# Patient Record
Sex: MALE | Race: WHITE | NOT HISPANIC OR LATINO | Employment: UNEMPLOYED | URBAN - METROPOLITAN AREA
[De-identification: names, ages, dates, MRNs, and addresses within clinical notes are randomized per-mention and may not be internally consistent; named-entity substitution may affect disease eponyms.]

---

## 2022-12-28 ENCOUNTER — HOSPITAL ENCOUNTER (INPATIENT)
Facility: HOSPITAL | Age: 62
LOS: 2 days | Discharge: HOME | DRG: 864 | End: 2022-12-30
Attending: FAMILY MEDICINE | Admitting: FAMILY MEDICINE
Payer: COMMERCIAL

## 2022-12-28 DIAGNOSIS — R50.81 NEUTROPENIC FEVER: ICD-10-CM

## 2022-12-28 DIAGNOSIS — R05.1 ACUTE COUGH: Primary | ICD-10-CM

## 2022-12-28 DIAGNOSIS — D70.9 NEUTROPENIC FEVER: ICD-10-CM

## 2022-12-28 DIAGNOSIS — R07.9 CHEST PAIN: ICD-10-CM

## 2022-12-28 DIAGNOSIS — R50.9 FEVER OF UNKNOWN ORIGIN: ICD-10-CM

## 2022-12-28 LAB
APPEARANCE UR: CLEAR
BILIRUB UR QL STRIP.AUTO: NEGATIVE MG/DL
COLOR UR AUTO: YELLOW
ERYTHROCYTE [SEDIMENTATION RATE] IN BLOOD: 14 MM/HR (ref 0–15)
FERRITIN SERPL-MCNC: 591.74 NG/ML (ref 21.81–274.66)
GLUCOSE UR QL STRIP.AUTO: NEGATIVE MG/DL
INR BLD: 0.94 (ref 0–1.3)
KETONES UR QL STRIP.AUTO: NEGATIVE MG/DL
LEUKOCYTE ESTERASE UR QL STRIP.AUTO: NEGATIVE UNIT/L
NITRITE UR QL STRIP.AUTO: NEGATIVE
PH UR STRIP.AUTO: 5 [PH]
PROT UR QL STRIP.AUTO: NEGATIVE MG/DL
PROTHROMBIN TIME: 12.5 SECONDS (ref 12.5–14.5)
RBC UR QL AUTO: NEGATIVE UNIT/L
SP GR UR STRIP.AUTO: 1.02
TROPONIN I SERPL-MCNC: <0.01 NG/ML (ref 0–0.04)
URATE SERPL-MCNC: 7.1 MG/DL (ref 3.5–7.2)
UROBILINOGEN UR STRIP-ACNC: 0.2 MG/DL

## 2022-12-28 PROCEDURE — 36415 COLL VENOUS BLD VENIPUNCTURE: CPT | Performed by: FAMILY MEDICINE

## 2022-12-28 PROCEDURE — 84550 ASSAY OF BLOOD/URIC ACID: CPT | Performed by: FAMILY MEDICINE

## 2022-12-28 PROCEDURE — 63600175 PHARM REV CODE 636 W HCPCS: Performed by: FAMILY MEDICINE

## 2022-12-28 PROCEDURE — 87040 BLOOD CULTURE FOR BACTERIA: CPT | Performed by: FAMILY MEDICINE

## 2022-12-28 PROCEDURE — 11000001 HC ACUTE MED/SURG PRIVATE ROOM

## 2022-12-28 PROCEDURE — 86140 C-REACTIVE PROTEIN: CPT | Performed by: FAMILY MEDICINE

## 2022-12-28 PROCEDURE — 85651 RBC SED RATE NONAUTOMATED: CPT | Performed by: FAMILY MEDICINE

## 2022-12-28 PROCEDURE — 85610 PROTHROMBIN TIME: CPT | Performed by: FAMILY MEDICINE

## 2022-12-28 PROCEDURE — 82728 ASSAY OF FERRITIN: CPT | Performed by: FAMILY MEDICINE

## 2022-12-28 PROCEDURE — 84484 ASSAY OF TROPONIN QUANT: CPT | Performed by: FAMILY MEDICINE

## 2022-12-28 PROCEDURE — 25000003 PHARM REV CODE 250: Performed by: FAMILY MEDICINE

## 2022-12-28 PROCEDURE — 81003 URINALYSIS AUTO W/O SCOPE: CPT | Performed by: FAMILY MEDICINE

## 2022-12-28 RX ORDER — ACETAMINOPHEN 325 MG/1
650 TABLET ORAL EVERY 8 HOURS PRN
Status: DISCONTINUED | OUTPATIENT
Start: 2022-12-28 | End: 2022-12-30 | Stop reason: HOSPADM

## 2022-12-28 RX ORDER — GLUCAGON 1 MG
1 KIT INJECTION
Status: DISCONTINUED | OUTPATIENT
Start: 2022-12-28 | End: 2022-12-30 | Stop reason: HOSPADM

## 2022-12-28 RX ORDER — CEFTRIAXONE 1 G/1
1 INJECTION, POWDER, FOR SOLUTION INTRAMUSCULAR; INTRAVENOUS
Status: DISCONTINUED | OUTPATIENT
Start: 2022-12-28 | End: 2022-12-28

## 2022-12-28 RX ORDER — NALOXONE HCL 0.4 MG/ML
0.02 VIAL (ML) INJECTION
Status: DISCONTINUED | OUTPATIENT
Start: 2022-12-28 | End: 2022-12-30 | Stop reason: HOSPADM

## 2022-12-28 RX ORDER — SODIUM CHLORIDE 0.9 % (FLUSH) 0.9 %
10 SYRINGE (ML) INJECTION EVERY 12 HOURS PRN
Status: DISCONTINUED | OUTPATIENT
Start: 2022-12-28 | End: 2022-12-30 | Stop reason: HOSPADM

## 2022-12-28 RX ADMIN — CEFTRIAXONE SODIUM 1 G: 1 INJECTION, POWDER, FOR SOLUTION INTRAMUSCULAR; INTRAVENOUS at 05:12

## 2022-12-28 NOTE — NURSING
PT DOES NO C/O ANY PAIN AT THIS TIME. C/O FEVER OF UNKNOWN ORIGIN UPON ARRIVAL. PT STATES BEEN SICK ABOUT 3 WEEKS NOW. PT HAS HX OF PREVIOUS CATARACT SX 6-7 YRS AGO. SWELLEN TO LEFT ANKLE ABLE TO BEAR WEIGHT NO C/O PAIN AT THIS TIME. PT STATES WAS USING CRUTCHES FOR A WEEK HAS BEEN OFF ABOUT X5 DAYS. WILL CONTINUE TO MONITOR FOR ANY CHANGES.

## 2022-12-28 NOTE — NURSING
PT ARRIVED DIRECT ADMIT PER DR. LUGO FOR INPATIENT. PT AAOX4 AMBULATORY C/O COUGH AND FEBRILE TEMPS. WILL CONTINUE TO MONITOR WIFE AT BEDSIDE

## 2022-12-29 PROBLEM — M25.40 SWOLLEN JOINT: Status: ACTIVE | Noted: 2022-12-29

## 2022-12-29 PROBLEM — R01.1 CHANGE IN HEART MURMUR: Status: ACTIVE | Noted: 2022-12-29

## 2022-12-29 PROBLEM — R50.9 FEVER AND CHILLS: Status: ACTIVE | Noted: 2022-12-29

## 2022-12-29 PROBLEM — R05.1 ACUTE COUGH: Status: ACTIVE | Noted: 2022-12-29

## 2022-12-29 LAB
ALBUMIN SERPL-MCNC: 3.8 G/DL (ref 3.4–4.8)
ALBUMIN/GLOB SERPL: 1.2 RATIO (ref 1.1–2)
ALP SERPL-CCNC: 49 UNIT/L (ref 40–150)
ALT SERPL-CCNC: 22 UNIT/L (ref 0–55)
AST SERPL-CCNC: 16 UNIT/L (ref 5–34)
BASOPHILS # BLD AUTO: 0.02 X10(3)/MCL (ref 0–0.2)
BASOPHILS NFR BLD AUTO: 0.3 %
BILIRUBIN DIRECT+TOT PNL SERPL-MCNC: 0.5 MG/DL
BUN SERPL-MCNC: 13 MG/DL (ref 8.4–25.7)
CALCIUM SERPL-MCNC: 9.5 MG/DL (ref 8.8–10)
CHLORIDE SERPL-SCNC: 103 MMOL/L (ref 98–107)
CO2 SERPL-SCNC: 24 MMOL/L (ref 23–31)
CREAT SERPL-MCNC: 1.69 MG/DL (ref 0.73–1.18)
CRP SERPL-MCNC: 18.7 MG/L
EOSINOPHIL # BLD AUTO: 0.14 X10(3)/MCL (ref 0–0.9)
EOSINOPHIL NFR BLD AUTO: 2.2 %
ERYTHROCYTE [DISTWIDTH] IN BLOOD BY AUTOMATED COUNT: 11.3 % (ref 11.6–14.4)
FIBRINOGEN PPP-MCNC: 389 MG/DL (ref 210–463)
GFR SERPLBLD CREATININE-BSD FMLA CKD-EPI: 45 MLS/MIN/1.73/M2
GLOBULIN SER-MCNC: 3.3 GM/DL (ref 2.4–3.5)
GLUCOSE SERPL-MCNC: 112 MG/DL (ref 82–115)
HCT VFR BLD AUTO: 40 % (ref 42–52)
HGB BLD-MCNC: 13.3 GM/DL (ref 14–18)
IMM GRANULOCYTES # BLD AUTO: 0.03 X10(3)/MCL (ref 0–0.04)
IMM GRANULOCYTES NFR BLD AUTO: 0.5 %
LYMPHOCYTES # BLD AUTO: 1.07 X10(3)/MCL (ref 0.6–4.6)
LYMPHOCYTES NFR BLD AUTO: 17 %
MCH RBC QN AUTO: 29.6 PG
MCHC RBC AUTO-ENTMCNC: 33.3 MG/DL (ref 33–36)
MCV RBC AUTO: 89.1 FL (ref 80–94)
MONOCYTES # BLD AUTO: 0.77 X10(3)/MCL (ref 0.1–1.3)
MONOCYTES NFR BLD AUTO: 12.2 %
NEUTROPHILS # BLD AUTO: 4.27 X10(3)/MCL (ref 2.1–9.2)
NEUTROPHILS NFR BLD AUTO: 67.8 %
PLATELET # BLD AUTO: 225 X10(3)/MCL (ref 140–371)
PMV BLD AUTO: 9.4 FL (ref 9.4–12.4)
POTASSIUM SERPL-SCNC: 4.2 MMOL/L (ref 3.5–5.1)
PROT SERPL-MCNC: 7.1 GM/DL (ref 5.8–7.6)
RBC # BLD AUTO: 4.49 X10(6)/MCL (ref 4.7–6.1)
SODIUM SERPL-SCNC: 136 MMOL/L (ref 136–145)
WBC # SPEC AUTO: 6.3 X10(3)/MCL (ref 4.5–11.5)

## 2022-12-29 PROCEDURE — 80053 COMPREHEN METABOLIC PANEL: CPT | Performed by: FAMILY MEDICINE

## 2022-12-29 PROCEDURE — 63600175 PHARM REV CODE 636 W HCPCS: Performed by: FAMILY MEDICINE

## 2022-12-29 PROCEDURE — 25000003 PHARM REV CODE 250: Performed by: FAMILY MEDICINE

## 2022-12-29 PROCEDURE — 11000001 HC ACUTE MED/SURG PRIVATE ROOM

## 2022-12-29 PROCEDURE — 94761 N-INVAS EAR/PLS OXIMETRY MLT: CPT

## 2022-12-29 PROCEDURE — 85025 COMPLETE CBC W/AUTO DIFF WBC: CPT | Performed by: FAMILY MEDICINE

## 2022-12-29 PROCEDURE — 85384 FIBRINOGEN ACTIVITY: CPT | Performed by: FAMILY MEDICINE

## 2022-12-29 PROCEDURE — 36415 COLL VENOUS BLD VENIPUNCTURE: CPT | Performed by: FAMILY MEDICINE

## 2022-12-29 RX ORDER — AMLODIPINE BESYLATE 10 MG/1
10 TABLET ORAL DAILY
Status: DISCONTINUED | OUTPATIENT
Start: 2022-12-29 | End: 2022-12-30 | Stop reason: HOSPADM

## 2022-12-29 RX ORDER — ESOMEPRAZOLE MAGNESIUM 10 MG/1
10 GRANULE, FOR SUSPENSION, EXTENDED RELEASE ORAL
Status: ON HOLD | COMMUNITY
End: 2023-03-17 | Stop reason: SDUPTHER

## 2022-12-29 RX ORDER — CLONIDINE HYDROCHLORIDE 0.1 MG/1
0.1 TABLET ORAL EVERY 8 HOURS PRN
Status: DISCONTINUED | OUTPATIENT
Start: 2022-12-29 | End: 2022-12-30 | Stop reason: HOSPADM

## 2022-12-29 RX ORDER — SODIUM CHLORIDE 9 MG/ML
INJECTION, SOLUTION INTRAVENOUS CONTINUOUS
Status: DISCONTINUED | OUTPATIENT
Start: 2022-12-29 | End: 2022-12-30 | Stop reason: HOSPADM

## 2022-12-29 RX ORDER — FENOFIBRATE 160 MG/1
145 TABLET ORAL EVERY MORNING
COMMUNITY

## 2022-12-29 RX ORDER — AMLODIPINE BESYLATE 10 MG/1
10 TABLET ORAL EVERY MORNING
COMMUNITY

## 2022-12-29 RX ORDER — MUPIROCIN 20 MG/G
OINTMENT TOPICAL 2 TIMES DAILY
Status: DISCONTINUED | OUTPATIENT
Start: 2022-12-29 | End: 2022-12-30 | Stop reason: HOSPADM

## 2022-12-29 RX ORDER — BENZONATATE 100 MG/1
100 CAPSULE ORAL 3 TIMES DAILY PRN
Status: DISCONTINUED | OUTPATIENT
Start: 2022-12-29 | End: 2022-12-30 | Stop reason: HOSPADM

## 2022-12-29 RX ADMIN — AZITHROMYCIN MONOHYDRATE 500 MG: 500 INJECTION, POWDER, LYOPHILIZED, FOR SOLUTION INTRAVENOUS at 09:12

## 2022-12-29 RX ADMIN — MUPIROCIN: 20 OINTMENT TOPICAL at 10:12

## 2022-12-29 RX ADMIN — BENZONATATE 100 MG: 100 CAPSULE ORAL at 12:12

## 2022-12-29 RX ADMIN — ACETAMINOPHEN 650 MG: 325 TABLET ORAL at 06:12

## 2022-12-29 RX ADMIN — MUPIROCIN 1 G: 20 OINTMENT TOPICAL at 09:12

## 2022-12-29 RX ADMIN — CEFTRIAXONE SODIUM 1 G: 1 INJECTION, POWDER, FOR SOLUTION INTRAMUSCULAR; INTRAVENOUS at 06:12

## 2022-12-29 RX ADMIN — ACETAMINOPHEN 650 MG: 325 TABLET ORAL at 01:12

## 2022-12-29 RX ADMIN — AMLODIPINE BESYLATE 10 MG: 10 TABLET ORAL at 12:12

## 2022-12-29 RX ADMIN — SODIUM CHLORIDE: 9 INJECTION, SOLUTION INTRAVENOUS at 11:12

## 2022-12-29 RX ADMIN — SODIUM CHLORIDE 500 ML: 9 INJECTION, SOLUTION INTRAVENOUS at 09:12

## 2022-12-29 RX ADMIN — ACETAMINOPHEN 650 MG: 325 TABLET ORAL at 09:12

## 2022-12-29 NOTE — PLAN OF CARE
Pt lives at home with wife. Normally independent and has help if needed. PCP: Lauri. Able to get Rx from pharmacy. Denies any needs at this time. Will follow.

## 2022-12-29 NOTE — PLAN OF CARE
PT LYING IN BED QUIETLY WITH EYES CLOSED.EASILY AROUSED TO VERBAL AND TACTILE STIMULI. RESP EVEN AND UNLABORED NO DISTRESS NOTED. NO C/O PAIN AT THIS TIME. PT RECEIVED PRN TYLENOL X1 DUE TO FEBRILE TEMP 101.2. TOLERATED WELL WILL CONTINUE TO MONITOR

## 2022-12-29 NOTE — H&P
HISTORY & PHYSICAL  Hospital Medicine    Team: Networked reference to record PCT     PRESENTING HISTORY     Chief Complaint/Reason for Admission:  fever     History of Present Illness:  Mr. Joshua Randle is a 62 y.o. male who presented with fever.  Also with cough.  Patient has a recent history of illness.  About 3-4 weeks ago he had a viral respiratory illness  when that was resolving, he developed a swollen left foot initially, he thought this was a gout flare.  By the time this symptoms occurred his respiratory illness had almost fully resolved However, it did not respond to colchicine as his gout flares normally do his uric acid was normal and the joint was not hot or tender as his gout flares normally presents with a swollen joint.  It was tender but not exquisitely tender.  He was unable to walk without crutches.  I did labs on him including an THA, CCP, rheumatoid factor his CRP was negative in his sed rate was in the mid 30s.  He responded quite well to low-dose prednisone the next day his symptoms are 90% better.  This is still a little sore but almost completely resolved.  He was seen yesterday and was doing fine he had 1 day of prednisone and 1 day diclofenac left.  Also of note when he was seen for his swollen foot he also had a small pustule/abscess underneath his right arm which I lanced delivery small amount of pus came out.  I put him on Keflex at that time the next day that was almost totally resolved    On 12/28, he presented to our clinic and was seen by Dr. Shay with 99.5 temp, however he had had 101 tab at home.  He had chills and he had developed a cough.  Not any since those present yesterday.  His COVID and flu tests were negative.  He did seem to have a murmur which I may have heard a very slight 1 yesterday on exam but this was an obvious murmur that I did not hear on exam prior.  His presentation yesterday was not that alarming in isolation adjust his 1 day.  However, with his prior  presentation at the swollen foot and now with fever, there was a bed available and we admitted him for a workup with blood cultures, chest x-ray, IV antibiotics, repeat sed rate and CRP, an echocardiogram to rule out any valve issues.  Patient was reluctant for admit that he did eventually agree to    Review of Systems:  ROS  As above  Negative for any GI symptoms  Negative for chest pain shortness is breath  Negative for any swollen joints other than  His foot 2 weeks ago negative for any rashes    PAST HISTORY:     Past Medical History:   Diagnosis Date    Hypertension        Past Surgical History:   Procedure Laterality Date    CATARACT EXTRACTION W/ INTRAOCULAR LENS  IMPLANT, BILATERAL Bilateral     6-7 YEARS AGO    EYE SURGERY         Family History   Problem Relation Age of Onset    Cancer Father        Social History     Socioeconomic History    Marital status:    Tobacco Use    Smoking status: Never    Smokeless tobacco: Never   Substance and Sexual Activity    Alcohol use: Yes     Alcohol/week: 84.0 standard drinks     Types: 84 Cans of beer per week     Comment: 12 BEERS/DAY    Drug use: Never    Sexual activity: Yes     Partners: Female       MEDICATIONS & ALLERGIES:     No current facility-administered medications on file prior to encounter.     No current outpatient medications on file prior to encounter.        Review of patient's allergies indicates:   Allergen Reactions    Venom-wasp Hives       OBJECTIVE:     Vital Signs:  Temp:  [98.4 °F (36.9 °C)-101.2 °F (38.4 °C)] 101.2 °F (38.4 °C)  Pulse:  [75-90] 90  Resp:  [20] 20  SpO2:  [93 %-98 %] 93 %  BP: (157-172)/(84-85) 157/85  Body mass index is 25.09 kg/m².     Physical Exam:  Physical Exam  Vitals:    12/29/22 0130   BP:    Pulse:    Resp:    Temp: (!) 101.2 °F (38.4 °C)       Physical Exam   Constitutional: alert & oriented, no acute distress  HENT:   Head: Normocephalic and atraumatic.   Eyes: Conjunctivae normal and EOM are normal.  Pupils are equal, round, and reactive to light.   Neck: Normal range of motion. Neck supple.   Cardiovascular: Normal rate, regular rhythm, normal heart sounds  systolic  murmur  2/6    Pulmonary/Chest: CTAB, nonlabored respiration  Abdominal: Soft, nontender, bowel sounds normal  Neurological: nonfocal  Skin: warm, dry intact  No   splinter  hemorrhages    No  purpera   No  lesions to  eyes   Psychiatric: normal mood and affect, cooperative        Laboratory  Recent Results (from the past 24 hour(s))   Urinalysis, Reflex to Urine Culture Urine, Clean Catch    Collection Time: 12/28/22  5:00 PM    Specimen: Urine   Result Value Ref Range    Color, UA Yellow Yellow, Light-Yellow, Dark Yellow, Glenny, Straw    Appearance, UA Clear Clear    Specific Gravity, UA 1.025     pH, UA 5.0 5.0 - 8.5    Protein, UA Negative Negative mg/dL    Glucose, UA Negative Negative, Normal mg/dL    Ketones, UA Negative Negative mg/dL    Blood, UA Negative Negative unit/L    Bilirubin, UA Negative Negative mg/dL    Urobilinogen, UA 0.2 0.2, 1.0, Normal mg/dL    Nitrites, UA Negative Negative    Leukocyte Esterase, UA Negative Negative unit/L   Ferritin    Collection Time: 12/28/22  5:02 PM   Result Value Ref Range    Ferritin Level 591.74 (H) 21.81 - 274.66 ng/mL   PT/INR    Collection Time: 12/28/22  5:02 PM   Result Value Ref Range    PT 12.5 12.5 - 14.5 seconds    INR 0.94 0.00 - 1.30   Sedimentation rate    Collection Time: 12/28/22  5:02 PM   Result Value Ref Range    Sed Rate 14 0 - 15 mm/hr   Uric Acid    Collection Time: 12/28/22  5:02 PM   Result Value Ref Range    Uric Acid 7.1 3.5 - 7.2 mg/dL   Troponin I    Collection Time: 12/28/22  5:39 PM   Result Value Ref Range    Troponin-I <0.010 0.000 - 0.045 ng/mL   Comprehensive Metabolic Panel (CMP)    Collection Time: 12/29/22  4:30 AM   Result Value Ref Range    Sodium Level 136 136 - 145 mmol/L    Potassium Level 4.2 3.5 - 5.1 mmol/L    Chloride 103 98 - 107 mmol/L    Carbon  Dioxide 24 23 - 31 mmol/L    Glucose Level 112 82 - 115 mg/dL    Blood Urea Nitrogen 13.0 8.4 - 25.7 mg/dL    Creatinine 1.69 (H) 0.73 - 1.18 mg/dL    Calcium Level Total 9.5 8.8 - 10.0 mg/dL    Protein Total 7.1 5.8 - 7.6 gm/dL    Albumin Level 3.8 3.4 - 4.8 g/dL    Globulin 3.3 2.4 - 3.5 gm/dL    Albumin/Globulin Ratio 1.2 1.1 - 2.0 ratio    Bilirubin Total 0.5 <=1.5 mg/dL    Alkaline Phosphatase 49 40 - 150 unit/L    Alanine Aminotransferase 22 0 - 55 unit/L    Aspartate Aminotransferase 16 5 - 34 unit/L    eGFR 45 mls/min/1.73/m2   CBC with Differential    Collection Time: 12/29/22  4:30 AM   Result Value Ref Range    WBC 6.3 4.5 - 11.5 x10(3)/mcL    RBC 4.49 (L) 4.70 - 6.10 x10(6)/mcL    Hgb 13.3 (L) 14.0 - 18.0 gm/dL    Hct 40.0 (L) 42.0 - 52.0 %    MCV 89.1 80.0 - 94.0 fL    MCH 29.6 pg    MCHC 33.3 33.0 - 36.0 mg/dL    RDW 11.3 (L) 11.6 - 14.4 %    Platelet 225 140 - 371 x10(3)/mcL    MPV 9.4 9.4 - 12.4 fL    Neut % 67.8 %    Lymph % 17.0 %    Mono % 12.2 %    Eos % 2.2 %    Basophil % 0.3 %    Lymph # 1.07 0.6 - 4.6 x10(3)/mcL    Neut # 4.27 2.1 - 9.2 x10(3)/mcL    Mono # 0.77 0.1 - 1.3 x10(3)/mcL    Eos # 0.14 0 - 0.9 x10(3)/mcL    Baso # 0.02 0 - 0.2 x10(3)/mcL    IG# 0.03 0 - 0.04 x10(3)/mcL    IG% 0.5 %         ASSESSMENT & PLAN:     Current Problems List:  Active Hospital Problems    Diagnosis  POA    Fever and chills [R50.9]  Yes    Acute cough [R05.1]  Yes    Swollen joint [M25.40]  Yes    Change in heart murmur [R01.1]  Yes      Resolved Hospital Problems   No resolved problems to display.   Pt  w  fever   Radiologist  reads  c xray  as neg   And  xray of  foot and ankle  ne g  Resp   pcr is pending  from  my office     His esr  a and crp  are better  but  just off of     Prednisone  Prior  swollen joint now  resolved     Had  a  resp  illness that resolved and a  swollen  joint that followed   No  fever.   Joint  resolved immediately   w pred   Also   small abscess /pustule under  arm  w  small I  and d and keflex  resolved  easiliy   10 days ago   Presented  w  fever  and  now  w  a loud  murmur  A possible   faint  murmur  prior  but this is a  loud  murmur     Will await  bone scan  due to  prior  joint  infection   Await  blood  cx   Echo  this am     Rocephin zithromax            Problem Assessment & Treatment Plan:

## 2022-12-30 VITALS
RESPIRATION RATE: 20 BRPM | HEART RATE: 67 BPM | BODY MASS INDEX: 25.06 KG/M2 | OXYGEN SATURATION: 93 % | HEIGHT: 72 IN | WEIGHT: 185 LBS | TEMPERATURE: 99 F | SYSTOLIC BLOOD PRESSURE: 142 MMHG | DIASTOLIC BLOOD PRESSURE: 76 MMHG

## 2022-12-30 LAB
ALBUMIN SERPL-MCNC: 3.6 G/DL (ref 3.4–4.8)
ALBUMIN/GLOB SERPL: 1.1 RATIO (ref 1.1–2)
ALP SERPL-CCNC: 45 UNIT/L (ref 40–150)
ALT SERPL-CCNC: 27 UNIT/L (ref 0–55)
AORTIC ROOT ANNULUS: 0 CM
AORTIC VALVE CUSP SEPERATION: 0.7 CM
AST SERPL-CCNC: 22 UNIT/L (ref 5–34)
AV INDEX (PROSTH): 0.42
AV MEAN GRADIENT: 18 MMHG
AV PEAK GRADIENT: 32 MMHG
AV VALVE AREA: 1.27 CM2
AV VELOCITY RATIO: 0.51
BASOPHILS # BLD AUTO: 0.02 X10(3)/MCL (ref 0–0.2)
BASOPHILS NFR BLD AUTO: 0.3 %
BILIRUBIN DIRECT+TOT PNL SERPL-MCNC: 0.5 MG/DL
BSA FOR ECHO PROCEDURE: 2.06 M2
BUN SERPL-MCNC: 15 MG/DL (ref 8.4–25.7)
CALCIUM SERPL-MCNC: 9.4 MG/DL (ref 8.8–10)
CHLORIDE SERPL-SCNC: 102 MMOL/L (ref 98–107)
CO2 SERPL-SCNC: 26 MMOL/L (ref 23–31)
CREAT SERPL-MCNC: 1.43 MG/DL (ref 0.73–1.18)
CV ECHO LV RWT: 0.89 CM
DOP CALC AO PEAK VEL: 2.85 M/S
DOP CALC AO VTI: 47.5 CM
DOP CALC LVOT AREA: 3 CM2
DOP CALC LVOT DIAMETER: 1.97 CM
DOP CALC LVOT PEAK VEL: 1.44 M/S
DOP CALC LVOT STROKE VOLUME: 60.32 CM3
DOP CALC MV VTI: 21.8 CM
DOP CALCLVOT PEAK VEL VTI: 19.8 CM
E WAVE DECELERATION TIME: 294.64 MSEC
E/A RATIO: 0.8
ECHO LV POSTERIOR WALL: 1.54 CM (ref 0.6–1.1)
EJECTION FRACTION: 64 %
EOSINOPHIL # BLD AUTO: 0.16 X10(3)/MCL (ref 0–0.9)
EOSINOPHIL NFR BLD AUTO: 2.7 %
ERYTHROCYTE [DISTWIDTH] IN BLOOD BY AUTOMATED COUNT: 11.5 % (ref 11.6–14.4)
FRACTIONAL SHORTENING: 49 % (ref 28–44)
GFR SERPLBLD CREATININE-BSD FMLA CKD-EPI: 55 MLS/MIN/1.73/M2
GLOBULIN SER-MCNC: 3.3 GM/DL (ref 2.4–3.5)
GLUCOSE SERPL-MCNC: 90 MG/DL (ref 82–115)
HCT VFR BLD AUTO: 40.9 % (ref 42–52)
HGB BLD-MCNC: 13.4 GM/DL (ref 14–18)
IMM GRANULOCYTES # BLD AUTO: 0.04 X10(3)/MCL (ref 0–0.04)
IMM GRANULOCYTES NFR BLD AUTO: 0.7 %
INTERVENTRICULAR SEPTUM: 1.39 CM (ref 0.6–1.1)
LEFT ATRIUM SIZE: 3.8 CM
LEFT INTERNAL DIMENSION IN SYSTOLE: 1.77 CM (ref 2.1–4)
LEFT VENTRICLE DIASTOLIC VOLUME INDEX: 24.07 ML/M2
LEFT VENTRICLE DIASTOLIC VOLUME: 49.58 ML
LEFT VENTRICLE MASS INDEX: 89 G/M2
LEFT VENTRICLE SYSTOLIC VOLUME INDEX: 8.6 ML/M2
LEFT VENTRICLE SYSTOLIC VOLUME: 17.71 ML
LEFT VENTRICULAR INTERNAL DIMENSION IN DIASTOLE: 3.46 CM (ref 3.5–6)
LEFT VENTRICULAR MASS: 183.22 G
LVOT MG: 3.7 MMHG
LVOT MV: 0.87 CM/S
LYMPHOCYTES # BLD AUTO: 1.43 X10(3)/MCL (ref 0.6–4.6)
LYMPHOCYTES NFR BLD AUTO: 24 %
MCH RBC QN AUTO: 29.7 PG
MCHC RBC AUTO-ENTMCNC: 32.8 MG/DL (ref 33–36)
MCV RBC AUTO: 90.7 FL (ref 80–94)
MONOCYTES # BLD AUTO: 0.85 X10(3)/MCL (ref 0.1–1.3)
MONOCYTES NFR BLD AUTO: 14.3 %
MV MEAN GRADIENT: 2 MMHG
MV PEAK A VEL: 0.89 M/S
MV PEAK E VEL: 0.71 M/S
MV PEAK GRADIENT: 4 MMHG
MV STENOSIS PRESSURE HALF TIME: 85.44 MS
MV VALVE AREA BY CONTINUITY EQUATION: 2.77 CM2
MV VALVE AREA P 1/2 METHOD: 2.57 CM2
NEUTROPHILS # BLD AUTO: 3.46 X10(3)/MCL (ref 2.1–9.2)
NEUTROPHILS NFR BLD AUTO: 58 %
PLATELET # BLD AUTO: 185 X10(3)/MCL (ref 140–371)
PMV BLD AUTO: 9.8 FL (ref 9.4–12.4)
POTASSIUM SERPL-SCNC: 4.1 MMOL/L (ref 3.5–5.1)
PROT SERPL-MCNC: 6.9 GM/DL (ref 5.8–7.6)
PV PEAK VELOCITY: 1.06 CM/S
RBC # BLD AUTO: 4.51 X10(6)/MCL (ref 4.7–6.1)
RIGHT VENTRICULAR END-DIASTOLIC DIMENSION: 2.36 CM
SODIUM SERPL-SCNC: 138 MMOL/L (ref 136–145)
WBC # SPEC AUTO: 6 X10(3)/MCL (ref 4.5–11.5)

## 2022-12-30 PROCEDURE — 85025 COMPLETE CBC W/AUTO DIFF WBC: CPT | Performed by: FAMILY MEDICINE

## 2022-12-30 PROCEDURE — 25000003 PHARM REV CODE 250: Performed by: FAMILY MEDICINE

## 2022-12-30 PROCEDURE — 80053 COMPREHEN METABOLIC PANEL: CPT | Performed by: FAMILY MEDICINE

## 2022-12-30 PROCEDURE — 36415 COLL VENOUS BLD VENIPUNCTURE: CPT | Performed by: FAMILY MEDICINE

## 2022-12-30 PROCEDURE — 63600175 PHARM REV CODE 636 W HCPCS: Performed by: FAMILY MEDICINE

## 2022-12-30 RX ORDER — DOXYCYCLINE 100 MG/1
100 CAPSULE ORAL 2 TIMES DAILY
Qty: 20 CAPSULE | Refills: 0 | Status: ON HOLD | OUTPATIENT
Start: 2022-12-30 | End: 2023-03-17 | Stop reason: HOSPADM

## 2022-12-30 RX ORDER — MAG HYDROX/ALUMINUM HYD/SIMETH 200-200-20
30 SUSPENSION, ORAL (FINAL DOSE FORM) ORAL
Status: DISCONTINUED | OUTPATIENT
Start: 2022-12-30 | End: 2022-12-30 | Stop reason: HOSPADM

## 2022-12-30 RX ORDER — SUCRALFATE 1 G/10ML
1 SUSPENSION ORAL EVERY 6 HOURS
Status: DISCONTINUED | OUTPATIENT
Start: 2022-12-30 | End: 2022-12-30 | Stop reason: HOSPADM

## 2022-12-30 RX ORDER — BENZONATATE 100 MG/1
100 CAPSULE ORAL 3 TIMES DAILY PRN
Qty: 20 CAPSULE | Refills: 0 | Status: SHIPPED | OUTPATIENT
Start: 2022-12-30 | End: 2023-01-09

## 2022-12-30 RX ORDER — PANTOPRAZOLE SODIUM 40 MG/1
40 TABLET, DELAYED RELEASE ORAL DAILY
Status: DISCONTINUED | OUTPATIENT
Start: 2022-12-30 | End: 2022-12-30 | Stop reason: HOSPADM

## 2022-12-30 RX ADMIN — SODIUM CHLORIDE: 9 INJECTION, SOLUTION INTRAVENOUS at 02:12

## 2022-12-30 RX ADMIN — AMLODIPINE BESYLATE 10 MG: 10 TABLET ORAL at 09:12

## 2022-12-30 RX ADMIN — PANTOPRAZOLE SODIUM 40 MG: 40 TABLET, DELAYED RELEASE ORAL at 10:12

## 2022-12-30 RX ADMIN — MUPIROCIN: 20 OINTMENT TOPICAL at 09:12

## 2022-12-30 RX ADMIN — AZITHROMYCIN MONOHYDRATE 500 MG: 500 INJECTION, POWDER, LYOPHILIZED, FOR SOLUTION INTRAVENOUS at 09:12

## 2022-12-30 NOTE — PLAN OF CARE
PT LYING IN BED QUIETLY WITH EYES CLOSED. EASILY AROUSED TO VERBAL AND TACTILE STIMULI. RESP EVEN AND UNLABORED NO DISTRESS OR SOB NOTED AT THIS TIME. PT TEMP STABLE AT THIS TIME 98.5. PT SKIN COOLER THAN STATED DURING THE DAY. PT NOT FLUSHED COLOR AT THIS TIME. WILL CONTINUE TO CLOSELY MONITOR TEMPERATURES.

## 2023-01-03 LAB
BACTERIA BLD CULT: NORMAL
BACTERIA BLD CULT: NORMAL

## 2023-01-09 NOTE — HOSPITAL COURSE
62 year  old   w m  w  a  hx of  fever  x   24  hours prior to admit.  And  a  hx of  gout and w  swollen foot that had   cleared w   prednisone.   He  had  a  cough  w   admission     3-4  weeks prior to  admission he had a  viral uri.   After this cleared he had  A  swelling of the   left foot.  He felt it  was gout.  He  tried  colchicine but this  was   Ineffective. Prednisone  helped.  He  also  got  keflex  bc he  had   A  small   infected pustule in right  axilla   He  felt better the next  day   He followed up in one week  andwas  much  better  He  came  back in the next  day  ( the day of  admit)  W  fever and cough.  Saw dr jere Harvey and flu   neg.   Had  repeated   fever  while admitted.   Xray  of chest   clear     Ct of chest  abd and  pelvis neg.   Blood cx and echo  neg  Bone  scan neg   except for ankle   which was  expected  Even if it was  gout.   He  was afebrile  and cough  was  better.   He had  an  mri.   It  was  Friday  afternoon and  it  was not  going to  be read for the   Weekend.  Pt  wanted to  be dc.  He  was  dc on doxy  w  follow up  for the next week

## 2023-01-09 NOTE — DISCHARGE SUMMARY
[facility]  Medical Surgical Unit  Hospital Medicine  Discharge Summary      Patient Name: Joshua Randle  MRN: [id number]  KEELY: [id number]  Patient Class: IP- Inpatient  Admission Date: 12/28/2022  Hospital Length of Stay: 2 days  Discharge Date and Time: 12/30/2022  3:12 PM  Attending Physician: No att. providers found   Discharging Provider: Rebecca Carreon MD  Primary Care Provider: Rebecca Carreon MD    Primary Care Team: Networked reference to record PCT     HPI:   No notes on file    * No surgery found *      Hospital Course:   62 year  old   w m  w  a  hx of  fever  x   24  hours prior to admit.  And  a  hx of  gout and w  swollen foot that had   cleared w   prednisone.   He  had  a  cough  w   admission     3-4  weeks prior to  admission he had a  viral uri.   After this cleared he had  A  swelling of the   left foot.  He felt it  was gout.  He  tried  colchicine but this  was   Ineffective. Prednisone  helped.  He  also  got  keflex  bc he  had   A  small   infected pustule in right  axilla   He  felt better the next  day   He followed up in one week  andwas  much  better  He  came  back in the next  day  ( the day of  admit)  W  fever and cough.  Saw dr jere Harvey and flu   neg.   Had  repeated   fever  while admitted.   Xray  of chest   clear     Ct of chest  abd and  pelvis neg.   Blood cx and echo  neg  Bone  scan neg   except for ankle   which was  expected  Even if it was  gout.   He  was afebrile  and cough  was  better.   He had  an  mri.   It  was  Friday  afternoon and  it  was not  going to  be read for the   Weekend.  Pt  wanted to  be dc.  He  was  dc on doxy  w  follow up  for the next week          Goals of Care Treatment Preferences:  Code Status: Full Code      Consults:     No new Assessment & Plan notes have been filed under this hospital service since the last note was generated.  Service: Hospital Medicine    Final Active Diagnoses:    Diagnosis Date Noted POA     PRINCIPAL PROBLEM:  Change in heart murmur [R01.1] 12/29/2022 Yes    Fever and chills [R50.9] 12/29/2022 Yes    Acute cough [R05.1] 12/29/2022 Yes    Swollen joint [M25.40] 12/29/2022 Yes      Problems Resolved During this Admission:       Discharged Condition: good    Disposition: Home or Self Care    Follow Up:   Follow-up Information     Rebecca Carreon MD Follow up in 1 week(s).    Specialty: Family Medicine  Contact information:  [address]  [place] [address]  [phone]                       Patient Instructions:      Diet Cardiac     Notify your health care provider if you experience any of the following:  temperature >100.4     Notify your health care provider if you experience any of the following:  severe uncontrolled pain     Notify your health care provider if you experience any of the following:  difficulty breathing or increased cough     Activity as tolerated           .      Pending Diagnostic Studies:     None         Medications:  Reconciled Home Medications:      Medication List      START taking these medications    benzonatate 100 MG capsule  Commonly known as: TESSALON  Take 1 capsule (100 mg total) by mouth 3 (three) times daily as needed for Cough.     doxycycline 100 MG Cap  Commonly known as: VIBRAMYCIN  Take 1 capsule (100 mg total) by mouth 2 (two) times daily.        CONTINUE taking these medications    amLODIPine 10 MG tablet  Commonly known as: NORVASC  Take 10 mg by mouth once daily.     esomeprazole magnesium 10 mg suspension  Commonly known as: NEXIUM  Take 10 mg by mouth before breakfast.     fenofibrate 160 MG Tab  Take 145 mg by mouth once daily.            Indwelling Lines/Drains at time of discharge:   Lines/Drains/Airways     None                 Time spent on the discharge of patient: 30 minutes         Rebecca Carreon MD  Department of Hospital Medicine  [facility] - Medical Surgical Unit

## 2023-03-16 ENCOUNTER — ANESTHESIA EVENT (OUTPATIENT)
Dept: SURGERY | Facility: HOSPITAL | Age: 63
End: 2023-03-16
Payer: COMMERCIAL

## 2023-03-16 ENCOUNTER — ANESTHESIA (OUTPATIENT)
Dept: SURGERY | Facility: HOSPITAL | Age: 63
End: 2023-03-16
Payer: COMMERCIAL

## 2023-03-16 ENCOUNTER — HOSPITAL ENCOUNTER (OUTPATIENT)
Facility: HOSPITAL | Age: 63
Discharge: HOME | End: 2023-03-17
Attending: INTERNAL MEDICINE | Admitting: FAMILY MEDICINE
Payer: COMMERCIAL

## 2023-03-16 DIAGNOSIS — T18.108A FOREIGN BODY IN ESOPHAGUS, INITIAL ENCOUNTER: Primary | ICD-10-CM

## 2023-03-16 DIAGNOSIS — R13.10 DYSPHAGIA: ICD-10-CM

## 2023-03-16 PROBLEM — I10 HTN (HYPERTENSION): Status: ACTIVE | Noted: 2023-03-16

## 2023-03-16 LAB
ALBUMIN SERPL-MCNC: 4.8 G/DL (ref 3.4–4.8)
ALBUMIN/GLOB SERPL: 1.3 RATIO (ref 1.1–2)
ALP SERPL-CCNC: 53 UNIT/L (ref 40–150)
ALT SERPL-CCNC: 23 UNIT/L (ref 0–55)
APPEARANCE UR: CLEAR
APTT PPP: 22.8 SECONDS (ref 23.2–33.7)
AST SERPL-CCNC: 19 UNIT/L (ref 5–34)
BACTERIA #/AREA URNS AUTO: ABNORMAL /HPF
BASOPHILS # BLD AUTO: 0.03 X10(3)/MCL (ref 0–0.2)
BASOPHILS NFR BLD AUTO: 0.5 %
BILIRUB UR QL STRIP.AUTO: ABNORMAL MG/DL
BILIRUBIN DIRECT+TOT PNL SERPL-MCNC: 0.8 MG/DL
BUN SERPL-MCNC: 13 MG/DL (ref 8.4–25.7)
CALCIUM SERPL-MCNC: 10.3 MG/DL (ref 8.8–10)
CHLORIDE SERPL-SCNC: 108 MMOL/L (ref 98–107)
CO2 SERPL-SCNC: 24 MMOL/L (ref 23–31)
COLOR UR AUTO: YELLOW
CREAT SERPL-MCNC: 1.39 MG/DL (ref 0.73–1.18)
EOSINOPHIL # BLD AUTO: 0.11 X10(3)/MCL (ref 0–0.9)
EOSINOPHIL NFR BLD AUTO: 2 %
ERYTHROCYTE [DISTWIDTH] IN BLOOD BY AUTOMATED COUNT: 12.9 % (ref 11.5–17)
GFR SERPLBLD CREATININE-BSD FMLA CKD-EPI: 57 MLS/MIN/1.73/M2
GLOBULIN SER-MCNC: 3.7 GM/DL (ref 2.4–3.5)
GLUCOSE SERPL-MCNC: 110 MG/DL (ref 82–115)
GLUCOSE UR QL STRIP.AUTO: NEGATIVE MG/DL
HCT VFR BLD AUTO: 48.4 % (ref 42–52)
HGB BLD-MCNC: 16.5 G/DL (ref 14–18)
IMM GRANULOCYTES # BLD AUTO: 0.02 X10(3)/MCL (ref 0–0.04)
IMM GRANULOCYTES NFR BLD AUTO: 0.4 %
INR BLD: 0.93 (ref 0–1.3)
KETONES UR QL STRIP.AUTO: NEGATIVE MG/DL
LEUKOCYTE ESTERASE UR QL STRIP.AUTO: NEGATIVE UNIT/L
LYMPHOCYTES # BLD AUTO: 1.02 X10(3)/MCL (ref 0.6–4.6)
LYMPHOCYTES NFR BLD AUTO: 18.6 %
MCH RBC QN AUTO: 29.9 PG
MCHC RBC AUTO-ENTMCNC: 34.1 G/DL (ref 33–36)
MCV RBC AUTO: 87.8 FL (ref 80–94)
MONOCYTES # BLD AUTO: 0.47 X10(3)/MCL (ref 0.1–1.3)
MONOCYTES NFR BLD AUTO: 8.6 %
MUCOUS THREADS URNS QL MICRO: ABNORMAL /LPF
NEUTROPHILS # BLD AUTO: 3.84 X10(3)/MCL (ref 2.1–9.2)
NEUTROPHILS NFR BLD AUTO: 69.9 %
NITRITE UR QL STRIP.AUTO: NEGATIVE
PH UR STRIP.AUTO: 5.5 [PH]
PLATELET # BLD AUTO: 230 X10(3)/MCL (ref 130–400)
PMV BLD AUTO: 9.2 FL (ref 7.4–10.4)
POTASSIUM SERPL-SCNC: 3.9 MMOL/L (ref 3.5–5.1)
PROT SERPL-MCNC: 8.5 GM/DL (ref 5.8–7.6)
PROT UR QL STRIP.AUTO: ABNORMAL MG/DL
PROTHROMBIN TIME: 12.7 SECONDS (ref 12.5–14.5)
RBC # BLD AUTO: 5.51 X10(6)/MCL (ref 4.7–6.1)
RBC #/AREA URNS AUTO: ABNORMAL /HPF
RBC UR QL AUTO: NEGATIVE UNIT/L
SODIUM SERPL-SCNC: 144 MMOL/L (ref 136–145)
SP GR UR STRIP.AUTO: >=1.03
SQUAMOUS #/AREA URNS AUTO: ABNORMAL /HPF
UROBILINOGEN UR STRIP-ACNC: 0.2 MG/DL
WBC # SPEC AUTO: 5.5 X10(3)/MCL (ref 4.5–11.5)
WBC #/AREA URNS AUTO: ABNORMAL /HPF

## 2023-03-16 PROCEDURE — 80053 COMPREHEN METABOLIC PANEL: CPT | Performed by: INTERNAL MEDICINE

## 2023-03-16 PROCEDURE — 85025 COMPLETE CBC W/AUTO DIFF WBC: CPT | Performed by: INTERNAL MEDICINE

## 2023-03-16 PROCEDURE — 25000003 PHARM REV CODE 250: Performed by: NURSE ANESTHETIST, CERTIFIED REGISTERED

## 2023-03-16 PROCEDURE — G0378 HOSPITAL OBSERVATION PER HR: HCPCS

## 2023-03-16 PROCEDURE — 27201423 OPTIME MED/SURG SUP & DEVICES STERILE SUPPLY: Performed by: INTERNAL MEDICINE

## 2023-03-16 PROCEDURE — 36000706: Performed by: INTERNAL MEDICINE

## 2023-03-16 PROCEDURE — 37000009 HC ANESTHESIA EA ADD 15 MINS: Performed by: INTERNAL MEDICINE

## 2023-03-16 PROCEDURE — 81001 URINALYSIS AUTO W/SCOPE: CPT | Performed by: INTERNAL MEDICINE

## 2023-03-16 PROCEDURE — C1773 RET DEV, INSERTABLE: HCPCS | Performed by: INTERNAL MEDICINE

## 2023-03-16 PROCEDURE — 85730 THROMBOPLASTIN TIME PARTIAL: CPT | Performed by: INTERNAL MEDICINE

## 2023-03-16 PROCEDURE — 36000707: Performed by: INTERNAL MEDICINE

## 2023-03-16 PROCEDURE — 71000033 HC RECOVERY, INTIAL HOUR: Performed by: INTERNAL MEDICINE

## 2023-03-16 PROCEDURE — 37000008 HC ANESTHESIA 1ST 15 MINUTES: Performed by: INTERNAL MEDICINE

## 2023-03-16 PROCEDURE — 25000003 PHARM REV CODE 250: Performed by: INTERNAL MEDICINE

## 2023-03-16 PROCEDURE — 63600175 PHARM REV CODE 636 W HCPCS: Performed by: NURSE ANESTHETIST, CERTIFIED REGISTERED

## 2023-03-16 PROCEDURE — 11000001 HC ACUTE MED/SURG PRIVATE ROOM

## 2023-03-16 PROCEDURE — 99285 EMERGENCY DEPT VISIT HI MDM: CPT | Mod: 25

## 2023-03-16 PROCEDURE — 85610 PROTHROMBIN TIME: CPT | Performed by: INTERNAL MEDICINE

## 2023-03-16 PROCEDURE — 25500020 PHARM REV CODE 255: Performed by: INTERNAL MEDICINE

## 2023-03-16 RX ORDER — TALC
6 POWDER (GRAM) TOPICAL NIGHTLY PRN
Status: DISCONTINUED | OUTPATIENT
Start: 2023-03-16 | End: 2023-03-17 | Stop reason: HOSPADM

## 2023-03-16 RX ORDER — FENOFIBRATE 145 MG/1
145 TABLET, FILM COATED ORAL DAILY
COMMUNITY
Start: 2023-01-18 | End: 2023-03-16 | Stop reason: SDUPTHER

## 2023-03-16 RX ORDER — EPHEDRINE SULFATE 50 MG/ML
INJECTION, SOLUTION INTRAVENOUS
Status: DISCONTINUED | OUTPATIENT
Start: 2023-03-16 | End: 2023-03-16

## 2023-03-16 RX ORDER — LIDOCAINE HYDROCHLORIDE 20 MG/ML
INJECTION, SOLUTION EPIDURAL; INFILTRATION; INTRACAUDAL; PERINEURAL
Status: DISCONTINUED | OUTPATIENT
Start: 2023-03-16 | End: 2023-03-16

## 2023-03-16 RX ORDER — GLUCAGON 1 MG
1 KIT INJECTION
Status: DISCONTINUED | OUTPATIENT
Start: 2023-03-16 | End: 2023-03-16

## 2023-03-16 RX ORDER — PROPOFOL 10 MG/ML
VIAL (ML) INTRAVENOUS
Status: DISCONTINUED | OUTPATIENT
Start: 2023-03-16 | End: 2023-03-16

## 2023-03-16 RX ORDER — SODIUM CHLORIDE 9 MG/ML
1000 INJECTION, SOLUTION INTRAVENOUS
Status: COMPLETED | OUTPATIENT
Start: 2023-03-16 | End: 2023-03-16

## 2023-03-16 RX ORDER — ONDANSETRON 2 MG/ML
INJECTION INTRAMUSCULAR; INTRAVENOUS
Status: DISCONTINUED | OUTPATIENT
Start: 2023-03-16 | End: 2023-03-16

## 2023-03-16 RX ORDER — SODIUM CHLORIDE 0.9 % (FLUSH) 0.9 %
10 SYRINGE (ML) INJECTION
Status: DISCONTINUED | OUTPATIENT
Start: 2023-03-16 | End: 2023-03-17 | Stop reason: HOSPADM

## 2023-03-16 RX ORDER — FENTANYL CITRATE 50 UG/ML
INJECTION, SOLUTION INTRAMUSCULAR; INTRAVENOUS
Status: DISCONTINUED | OUTPATIENT
Start: 2023-03-16 | End: 2023-03-16

## 2023-03-16 RX ORDER — ROCURONIUM BROMIDE 10 MG/ML
INJECTION, SOLUTION INTRAVENOUS
Status: DISCONTINUED | OUTPATIENT
Start: 2023-03-16 | End: 2023-03-16

## 2023-03-16 RX ORDER — DEXTROSE MONOHYDRATE AND SODIUM CHLORIDE 5; .45 G/100ML; G/100ML
INJECTION, SOLUTION INTRAVENOUS CONTINUOUS
Status: CANCELLED | OUTPATIENT
Start: 2023-03-17 | End: 2023-03-18

## 2023-03-16 RX ORDER — ONDANSETRON 4 MG/1
4 TABLET, ORALLY DISINTEGRATING ORAL EVERY 6 HOURS PRN
Status: CANCELLED | OUTPATIENT
Start: 2023-03-17

## 2023-03-16 RX ORDER — DEXAMETHASONE SODIUM PHOSPHATE 4 MG/ML
INJECTION, SOLUTION INTRA-ARTICULAR; INTRALESIONAL; INTRAMUSCULAR; INTRAVENOUS; SOFT TISSUE
Status: DISCONTINUED | OUTPATIENT
Start: 2023-03-16 | End: 2023-03-16

## 2023-03-16 RX ADMIN — DEXAMETHASONE SODIUM PHOSPHATE 4 MG: 4 INJECTION, SOLUTION INTRA-ARTICULAR; INTRALESIONAL; INTRAMUSCULAR; INTRAVENOUS; SOFT TISSUE at 10:03

## 2023-03-16 RX ADMIN — SODIUM CHLORIDE, POTASSIUM CHLORIDE, SODIUM LACTATE AND CALCIUM CHLORIDE: 600; 310; 30; 20 INJECTION, SOLUTION INTRAVENOUS at 09:03

## 2023-03-16 RX ADMIN — ROCURONIUM BROMIDE 50 MG: 10 INJECTION INTRAVENOUS at 10:03

## 2023-03-16 RX ADMIN — EPHEDRINE SULFATE 15 MG: 50 INJECTION INTRAVENOUS at 10:03

## 2023-03-16 RX ADMIN — ONDANSETRON 4 MG: 2 INJECTION INTRAMUSCULAR; INTRAVENOUS at 10:03

## 2023-03-16 RX ADMIN — SODIUM CHLORIDE 1000 ML: 9 INJECTION, SOLUTION INTRAVENOUS at 08:03

## 2023-03-16 RX ADMIN — FENTANYL CITRATE 100 MCG: 50 INJECTION, SOLUTION INTRAMUSCULAR; INTRAVENOUS at 10:03

## 2023-03-16 RX ADMIN — PROPOFOL 200 MG: 10 INJECTION, EMULSION INTRAVENOUS at 10:03

## 2023-03-16 RX ADMIN — SUGAMMADEX 150 MG: 100 INJECTION, SOLUTION INTRAVENOUS at 11:03

## 2023-03-16 RX ADMIN — LIDOCAINE HYDROCHLORIDE 100 MG: 20 INJECTION, SOLUTION EPIDURAL; INFILTRATION; INTRACAUDAL; PERINEURAL at 10:03

## 2023-03-16 RX ADMIN — IOPAMIDOL 100 ML: 755 INJECTION, SOLUTION INTRAVENOUS at 05:03

## 2023-03-16 NOTE — ED PROVIDER NOTES
Encounter Date: 3/16/2023       History     Chief Complaint   Patient presents with    Dysphagia     Food bolus stuck in throat   started at supper last night     HPI    Patient with history of hypertension, daily alcohol use, comes to the emergency room with complaint of difficulty to swallow water or food since yesterday when he choked on some food, he talked to his family doctor, who told him to come to the emergency room  Review of patient's allergies indicates:   Allergen Reactions    Venom-wasp Hives     Past Medical History:   Diagnosis Date    GERD (gastroesophageal reflux disease)     Hypertension      Past Surgical History:   Procedure Laterality Date    CATARACT EXTRACTION W/ INTRAOCULAR LENS  IMPLANT, BILATERAL Bilateral     6-7 YEARS AGO    EYE SURGERY       Family History   Problem Relation Age of Onset    Hypertension Mother     Cancer Father      Social History     Tobacco Use    Smoking status: Never    Smokeless tobacco: Never   Substance Use Topics    Alcohol use: Yes     Alcohol/week: 84.0 standard drinks     Types: 84 Cans of beer per week     Comment: 12 BEERS/DAY    Drug use: Never     Review of Systems   Constitutional:  Negative for fever.   HENT:  Positive for sore throat. Negative for trouble swallowing and voice change.    Eyes:  Negative for visual disturbance.   Respiratory:  Negative for cough and shortness of breath.    Cardiovascular:  Negative for chest pain.   Gastrointestinal:  Negative for abdominal pain, diarrhea and vomiting.   Genitourinary:  Negative for dysuria and hematuria.   Musculoskeletal:  Negative for gait problem.        No Pain.   Skin:  Negative for color change and rash.   Neurological:  Negative for headaches.   Psychiatric/Behavioral:  Negative for behavioral problems and sleep disturbance.    All other systems reviewed and are negative.    Physical Exam     Initial Vitals [03/16/23 1625]   BP Pulse Resp Temp SpO2   (!) 173/90 86 16 98.2 °F (36.8 °C) 98 %       MAP       --         Physical Exam    Nursing note and vitals reviewed.  Constitutional: He appears well-developed and well-nourished. No distress.   HENT:   Head: Atraumatic.   Eyes: EOM are normal.   Neck:   Normal range of motion.  Cardiovascular:  Normal rate, regular rhythm and normal heart sounds.           Pulmonary/Chest: Breath sounds normal. No stridor. No respiratory distress.   Abdominal: Abdomen is soft. Bowel sounds are normal. He exhibits no distension. There is no abdominal tenderness.   Musculoskeletal:         General: Normal range of motion.      Cervical back: Normal range of motion. No bony tenderness.     Neurological: He is alert.   Speech Normal   Skin: Skin is dry.   Psychiatric: He has a normal mood and affect.   Pleasant       ED Course   Critical Care    Date/Time: 3/16/2023 7:25 PM  Performed by: Amanda Gibson MD  Authorized by: Amanda Gibson MD   Direct patient critical care time: 20 minutes  Ordering / reviewing critical care time: 10 minutes  Consulting other physicians critical care time: 10 minutes  Total critical care time (exclusive of procedural time) : 40 minutes  Critical care was necessary to treat or prevent imminent or life-threatening deterioration of the following conditions: dehydration.  Critical care was time spent personally by me on the following activities: review of old charts, ordering and review of laboratory studies and ordering and performing treatments and interventions.  Subsequent provider of critical care: I assumed direction of critical care for this patient from another provider of my specialty.  Comments: Patient's care was assumed from Dr. Isaac        Orders Placed This Encounter   Procedures    Critical Care    X-Ray Chest PA And Lateral    X-Ray Neck Soft Tissue    CT Chest With Contrast    CT Soft Tissue Neck With Contrast    Comprehensive metabolic panel    CBC auto differential    Urinalysis, Reflex to Urine Culture    CBC with  Differential    Urinalysis, Microscopic    Protime-INR    APTT    Insert Saline lock IV     Medications   iopamidoL (ISOVUE-370) injection 100 mL (100 mLs Intravenous Given 3/16/23 1748)     Admission on 03/16/2023   Component Date Value Ref Range Status    Sodium Level 03/16/2023 144  136 - 145 mmol/L Final    Potassium Level 03/16/2023 3.9  3.5 - 5.1 mmol/L Final    Chloride 03/16/2023 108 (H)  98 - 107 mmol/L Final    Carbon Dioxide 03/16/2023 24  23 - 31 mmol/L Final    Glucose Level 03/16/2023 110  82 - 115 mg/dL Final    Blood Urea Nitrogen 03/16/2023 13.0  8.4 - 25.7 mg/dL Final    Creatinine 03/16/2023 1.39 (H)  0.73 - 1.18 mg/dL Final    Calcium Level Total 03/16/2023 10.3 (H)  8.8 - 10.0 mg/dL Final    Protein Total 03/16/2023 8.5 (H)  5.8 - 7.6 gm/dL Final    Albumin Level 03/16/2023 4.8  3.4 - 4.8 g/dL Final    Globulin 03/16/2023 3.7 (H)  2.4 - 3.5 gm/dL Final    Albumin/Globulin Ratio 03/16/2023 1.3  1.1 - 2.0 ratio Final    Bilirubin Total 03/16/2023 0.8  <=1.5 mg/dL Final    Alkaline Phosphatase 03/16/2023 53  40 - 150 unit/L Final    Alanine Aminotransferase 03/16/2023 23  0 - 55 unit/L Final    Aspartate Aminotransferase 03/16/2023 19  5 - 34 unit/L Final    eGFR 03/16/2023 57  mls/min/1.73/m2 Final    Color, UA 03/16/2023 Yellow  Yellow, Light-Yellow, Dark Yellow, Glenny, Straw Final    Appearance, UA 03/16/2023 Clear  Clear Final    Specific Gravity, UA 03/16/2023 >=1.030   Final    pH, UA 03/16/2023 5.5  5.0 - 8.5 Final    Protein, UA 03/16/2023 2+ (A)  Negative mg/dL Final    Glucose, UA 03/16/2023 Negative  Negative, Normal mg/dL Final    Ketones, UA 03/16/2023 Negative  Negative mg/dL Final    Blood, UA 03/16/2023 Negative  Negative unit/L Final    Bilirubin, UA 03/16/2023 1+ (A)  Negative mg/dL Final    Urobilinogen, UA 03/16/2023 0.2  0.2, 1.0, Normal mg/dL Final    Nitrites, UA 03/16/2023 Negative  Negative Final    Leukocyte Esterase, UA 03/16/2023 Negative  Negative unit/L Final    WBC  03/16/2023 5.5  4.5 - 11.5 x10(3)/mcL Final    RBC 03/16/2023 5.51  4.70 - 6.10 x10(6)/mcL Final    Hgb 03/16/2023 16.5  14.0 - 18.0 g/dL Final    Hct 03/16/2023 48.4  42.0 - 52.0 % Final    MCV 03/16/2023 87.8  80.0 - 94.0 fL Final    MCH 03/16/2023 29.9  pg Final    MCHC 03/16/2023 34.1  33.0 - 36.0 g/dL Final    RDW 03/16/2023 12.9  11.5 - 17.0 % Final    Platelet 03/16/2023 230  130 - 400 x10(3)/mcL Final    MPV 03/16/2023 9.2  7.4 - 10.4 fL Final    Neut % 03/16/2023 69.9  % Final    Lymph % 03/16/2023 18.6  % Final    Mono % 03/16/2023 8.6  % Final    Eos % 03/16/2023 2.0  % Final    Basophil % 03/16/2023 0.5  % Final    Lymph # 03/16/2023 1.02  0.6 - 4.6 x10(3)/mcL Final    Neut # 03/16/2023 3.84  2.1 - 9.2 x10(3)/mcL Final    Mono # 03/16/2023 0.47  0.1 - 1.3 x10(3)/mcL Final    Eos # 03/16/2023 0.11  0 - 0.9 x10(3)/mcL Final    Baso # 03/16/2023 0.03  0 - 0.2 x10(3)/mcL Final    IG# 03/16/2023 0.02  0 - 0.04 x10(3)/mcL Final    IG% 03/16/2023 0.4  % Final    Bacteria, UA 03/16/2023 None Seen  None Seen, Rare, Occasional /HPF Final    Mucous, UA 03/16/2023 Moderate (A)  None Seen /LPF Final    RBC, UA 03/16/2023 0-2  None Seen, 0-2, 3-5, 0-5 /HPF Final    WBC, UA 03/16/2023 0-2  None Seen, 0-2, 3-5, 0-5 /HPF Final    Squamous Epithelial Cells, UA 03/16/2023 Few (A)  None Seen, Rare, Occasional, Occ /HPF Final    PT 03/16/2023 12.7  12.5 - 14.5 seconds Final    INR 03/16/2023 0.93  0.00 - 1.30 Final    PTT 03/16/2023 22.8 (L)  23.2 - 33.7 seconds Final            Imaging Results              CT Soft Tissue Neck With Contrast (Final result)  Result time 03/16/23 17:57:21   Procedure changed from CT Soft Tissue Neck WO Contrast     Final result by Ranjeet Degroot MD (03/16/23 17:57:21)                   Impression:      1. Essentially negative exam.      Electronically signed by: Ranjeet Degroot MD  Date:    03/16/2023  Time:    17:57               Narrative:    EXAMINATION:  CT SOFT TISSUE NECK WITH  CONTRAST    CLINICAL HISTORY:  Soft tissue swelling, infection suspected, neck xray done;    TECHNIQUE:  Helical axial images as well as sagittal and coronal reconstructions were performed from the skull base to the clavicles following the intravenous administration of contrast.  100 mL Isovue 370.  Automated exposure control, dose radiation lower technique was utilized.    COMPARISON:  Neck radiographs from earlier in the day    FINDINGS:  Partially visualized intracranial contents appear normal.  Patient status post bilateral lens replacement surgery.  Clear paranasal sinuses and mastoid air cells.    Nasal cavity and oral cavity appear within normal limits.  Contours and enhancement pattern along the nasopharynx, oropharynx and larynx appear normal.    The parotid, submandibular glands and thyroid gland appear normal.  Patent jugular veins bilaterally.  There is calcified atherosclerotic plaque at the carotid bulbs bilaterally.  No flow limiting stenosis.  No prevertebral edema.  Lung apices are clear.  Scattered cervical lymph nodes measure less than 1 cm short axis dimension.                                        CT Chest With Contrast (Final result)  Result time 03/16/23 18:03:32      Final result by Ranjeet Degroot MD (03/16/23 18:03:32)                   Impression:      1. Potentially ingested material bolus at the distal 3rd of the thoracic esophagus (image 78, series 3).  There is mild dilation involving the more proximal thoracic esophagus which is probably fluid-filled with an air-fluid level.  This report was flagged in Epic as abnormal.      Electronically signed by: Ranjeet Degroot MD  Date:    03/16/2023  Time:    18:03               Narrative:    EXAMINATION:  CT CHEST WITH CONTRAST    CLINICAL HISTORY:  Dysphasia    TECHNIQUE:  Helical axial CT images were obtained through the chest after IV contrast.100 mL Isovue 370.  Coronal and sagittal reconstructions submitted and interpreted.  Automated exposure control, dose radiation lowering technique, was utilized.    COMPARISON:  Chest radiograph from earlier in the day    FINDINGS:  Unremarkable thoracic inlet.  Heart size is normal.  There is mild atherosclerosis of the thoracic aorta and its branches including the coronary arteries.  No axillary, mediastinal or perihilar lymphadenopathy.  No pericardial effusion.    Patent central airways.  No filling defects.  Entire course of the thoracic esophagus is mildly patulous with air-fluid level at the proximal 3rd of the thoracic esophagus.  Mid and distal thirds of the thoracic esophagus are fluid-filled.  At the distal 3rd of the thoracic esophagus, just above the GE junction is a small bolus of material measuring 1.7 by 2.0 cm (image 78, series 3).    Lungs are clear with the exception of scattered dependent atelectasis.  Partially visualized solid abdominal viscera appear normal.  No suspicious osteolytic or osteoblastic lesion.                                       X-Ray Chest PA And Lateral (Final result)  Result time 03/16/23 17:41:33      Final result by Ranjeet Degroot MD (03/16/23 17:41:33)                   Impression:      1. No evidence of acute cardiopulmonary abnormality.      Electronically signed by: Ranjeet Degroot MD  Date:    03/16/2023  Time:    17:41               Narrative:    EXAMINATION:  XR CHEST PA AND LATERAL    CLINICAL HISTORY:  Dysphagia, unspecified    TECHNIQUE:  PA lateral views of the chest are obtained.    COMPARISON:  12/29/2022    FINDINGS:  Heart size is normal.  There is no pulmonary edema.  There is no focal consolidation, pleural effusion or pneumothorax.                        Wet Read by Татьяна Isaac MD (03/16/23 17:19:35, [facility] - Emergency Dept, Emergency Medicine)    X-ray chest Two view:  Independently reviewed and/or interpreted by Татьяна Isaac MD  No focal consolidation, no acute cardiothoracic abnormality identified                                      X-Ray Neck Soft Tissue (Final result)  Result time 03/16/23 17:42:51      Final result by Ranjeet Degroot MD (03/16/23 17:42:51)                   Impression:      Negative exam.      Electronically signed by: Ranjeet Degroot MD  Date:    03/16/2023  Time:    17:42               Narrative:    EXAMINATION:  XR NECK SOFT TISSUE    CLINICAL HISTORY:  Dysphagia, unspecified    TECHNIQUE:  AP and lateral soft tissue views the neck were performed.    COMPARISON:  None.    FINDINGS:  AP and lateral views of the soft tissues of the neck are obtained.  Epiglottis appears normal.  Cervical arterial calcifications are present at both sides of the neck, left greater than the right.  Unremarkable appearance of the airway.  No prevertebral edema.                        Wet Read by Татьяна Isaac MD (03/16/23 17:19:59, [facility] - Emergency Dept, Emergency Medicine)    X-Ray, Independently Interpreted by Татьяна Isaac MD.    X-ray neck soft tissue:  Questionable foreign body in the upper esophagus.                                     Medications   iopamidoL (ISOVUE-370) injection 100 mL (100 mLs Intravenous Given 3/16/23 1748)     Medical Decision Making:   Initial Assessment:   Patient with history of hypertension, daily alcohol use, comes to the emergency room with complaint of difficulty to swallow water or food since yesterday when he choked on some food, he talked to his family doctor, who told him to come to the emergency room.  I talked to patient's family physician and he wants to get the CT chest done to determine if he has some mass or something else in the esophagus causing dysphagia before they can do endoscopy to determine the cause of his dysphagia.    Patient mainly points to the upper neck, he says he is unable to swallow water or food at all, he drinks a 12 pack a daily basis, and Dr. Greco was concerned that he might have some other pathology causing  dysphagia.    I am going to do the x-ray of the soft tissue of the neck and chest 1st to determine if he has a foreign body and then I will order CT scan as needed.  Patient mainly complains of upper throat and neck dysphagia.      Differential Diagnosis:   Esophageal stricture, foreign body of esophageus  Clinical Tests:   Lab Tests: Ordered and Reviewed  Radiological Study: Ordered and Reviewed  ED Management:  Ct of chest and neck revealed FB at distal esopgageous           ED Course as of 03/16/23 1934   Thu Mar 16, 2023   1719 I laid the patient down to see if he can tolerate laying down for the CT scan, and he was able to lay down long enough that we can do the CT scan on him.  I will go ahead and do the CT scan of neck and chest with contrast. [GQ]   1725 I talked to Dr. Greco, and he says he is finishing his last patient he will come to the emergency room to see the patient. [GQ]   1751 Patient is getting a CT scan done at this time, I will transfer his care over to Dr. Gibson.  We are waiting for Dr. Greco to come and see him. [GQ]   1813 His care was assumed from Dr. Isaac, patient was re-examined aided re-evaluated, medical records are reviewed, CT of chest revealed FB position and distal 3rd of thoracic esophagus, DR Greco was called in, will come to see patient in 20 minutes [IP]   1924 Patient was evaluated by Dr. Greco, recommended to admit patient under name of Dr. Carreon, patient will go to OR for EGD and FB removal tomorrow morning [IP]      ED Course User Index  [GQ] Татьяна Isaac MD  [IP] Amanda Gibson MD                 Clinical Impression:   Final diagnoses:  [R13.10] Dysphagia  [T18.108A] Foreign body in esophagus, initial encounter (Primary)        ED Disposition Condition    Admit Stable                Amanda Gibson MD  03/16/23 1934

## 2023-03-16 NOTE — FIRST PROVIDER EVALUATION
Medical screening examination initiated.  I have conducted a focused provider triage encounter, findings are as follows:    Brief history of present illness:  Sent by Dr Carreon for food bolus    There were no vitals filed for this visit.    Pertinent physical exam:  AAO x 3. Trach midline. No acute distress. SERRANO. Neuro intact. Psych normal    Brief workup plan:  glucagon     Preliminary workup initiated; this workup will be continued and followed by the physician or advanced practice provider that is assigned to the patient when roomed.

## 2023-03-17 VITALS
SYSTOLIC BLOOD PRESSURE: 148 MMHG | OXYGEN SATURATION: 96 % | HEIGHT: 72 IN | TEMPERATURE: 98 F | BODY MASS INDEX: 24.65 KG/M2 | DIASTOLIC BLOOD PRESSURE: 69 MMHG | HEART RATE: 59 BPM | WEIGHT: 182 LBS | RESPIRATION RATE: 18 BRPM

## 2023-03-17 PROCEDURE — G0378 HOSPITAL OBSERVATION PER HR: HCPCS

## 2023-03-17 PROCEDURE — 25500020 PHARM REV CODE 255: Performed by: INTERNAL MEDICINE

## 2023-03-17 PROCEDURE — 63600175 PHARM REV CODE 636 W HCPCS: Performed by: INTERNAL MEDICINE

## 2023-03-17 PROCEDURE — 25000003 PHARM REV CODE 250: Performed by: INTERNAL MEDICINE

## 2023-03-17 PROCEDURE — A9698 NON-RAD CONTRAST MATERIALNOC: HCPCS | Performed by: INTERNAL MEDICINE

## 2023-03-17 PROCEDURE — 94761 N-INVAS EAR/PLS OXIMETRY MLT: CPT

## 2023-03-17 PROCEDURE — C9113 INJ PANTOPRAZOLE SODIUM, VIA: HCPCS | Performed by: INTERNAL MEDICINE

## 2023-03-17 RX ORDER — ESOMEPRAZOLE MAGNESIUM 10 MG/1
10 GRANULE, FOR SUSPENSION, EXTENDED RELEASE ORAL 2 TIMES DAILY
Qty: 60 EACH | Refills: 2 | Status: SHIPPED | OUTPATIENT
Start: 2023-03-17

## 2023-03-17 RX ORDER — AMILORIDE HYDROCHLORIDE 5 MG/1
10 TABLET ORAL DAILY
Status: COMPLETED | OUTPATIENT
Start: 2023-03-17 | End: 2023-03-17

## 2023-03-17 RX ORDER — ONDANSETRON 2 MG/ML
4 INJECTION INTRAMUSCULAR; INTRAVENOUS DAILY PRN
Status: DISCONTINUED | OUTPATIENT
Start: 2023-03-17 | End: 2023-03-17

## 2023-03-17 RX ORDER — AMILORIDE HYDROCHLORIDE 5 MG/1
5 TABLET ORAL DAILY
Status: DISCONTINUED | OUTPATIENT
Start: 2023-03-17 | End: 2023-03-17

## 2023-03-17 RX ORDER — HYDROMORPHONE HYDROCHLORIDE 2 MG/ML
0.2 INJECTION, SOLUTION INTRAMUSCULAR; INTRAVENOUS; SUBCUTANEOUS EVERY 5 MIN PRN
Status: DISCONTINUED | OUTPATIENT
Start: 2023-03-17 | End: 2023-03-17

## 2023-03-17 RX ORDER — DIPHENHYDRAMINE HYDROCHLORIDE 50 MG/ML
25 INJECTION INTRAMUSCULAR; INTRAVENOUS EVERY 6 HOURS PRN
Status: DISCONTINUED | OUTPATIENT
Start: 2023-03-17 | End: 2023-03-17

## 2023-03-17 RX ADMIN — PANTOPRAZOLE SODIUM 40 MG: 40 INJECTION, POWDER, FOR SOLUTION INTRAVENOUS at 05:03

## 2023-03-17 RX ADMIN — BARIUM SULFATE 135 ML: 980 POWDER, FOR SUSPENSION ORAL at 09:03

## 2023-03-17 RX ADMIN — AMILORIDE HYDROCLORIDE 10 MG: 5 TABLET ORAL at 01:03

## 2023-03-17 NOTE — ASSESSMENT & PLAN NOTE
Will place the patient NPO of course and attempting to bring him to surgery this evening for food bolus removal.

## 2023-03-17 NOTE — H&P (VIEW-ONLY)
[facility]  Emergency Dept  [facility]  History & Physical    Patient Name: Joshua Randle  MRN: [id number]  Patient Class: IP- Inpatient  Admission Date: 3/16/2023  Attending Physician: Peter Greco III, MD   Primary Care Provider: Rebecca Carreon MD         Patient information was obtained from patient, spouse/SO and ER records.     Subjective:     Principal Problem:Foreign body in esophagus    Chief Complaint:   Chief Complaint   Patient presents with    Dysphagia     Food bolus stuck in throat   started at supper last night        HPI: This is a 62-year-old white male with a history of reflux and daily alcohol use proximally 6-12 beers per day), who went to a Mexican food restaurant [employer] last night and had 3 bites of Carnitas and felt like something was stuck.  Since then it has been more than 24 hours and he has not been able to get anything else down.  He could not finish his meal last night.  He has been salivating and not able to swallow his own secretions.    We are admitting him to the hospital and urgent evaluation for food bolus removal.  He admits that he has had this happen over the last couple of months but no predictable pattern has occurred.      Chest CT thus far does not show any ominous other competing etiologies.  Just a food bolus in the distal 3rd.      Past Medical History:   Diagnosis Date    GERD (gastroesophageal reflux disease)     Hypertension        Past Surgical History:   Procedure Laterality Date    CATARACT EXTRACTION W/ INTRAOCULAR LENS  IMPLANT, BILATERAL Bilateral     6-7 YEARS AGO    EYE SURGERY         Review of patient's allergies indicates:   Allergen Reactions    Venom-wasp Hives       No current facility-administered medications on file prior to encounter.     Current Outpatient Medications on File Prior to Encounter   Medication Sig    amLODIPine (NORVASC) 10 MG tablet Take 10 mg by mouth once daily.    doxycycline (VIBRAMYCIN) 100 MG Cap  Take 1 capsule (100 mg total) by mouth 2 (two) times daily.    esomeprazole magnesium (NEXIUM) 10 mg suspension Take 10 mg by mouth before breakfast.    fenofibrate 160 MG Tab Take 145 mg by mouth once daily.     Family History       Problem Relation (Age of Onset)    Cancer Father    Hypertension Mother          Tobacco Use    Smoking status: Never    Smokeless tobacco: Never   Substance and Sexual Activity    Alcohol use: Yes     Alcohol/week: 84.0 standard drinks     Types: 84 Cans of beer per week     Comment: 12 BEERS/DAY    Drug use: Never    Sexual activity: Yes     Partners: Female     Review of Systems   HENT:  Positive for drooling.    Gastrointestinal:  Positive for vomiting.   Objective:     Vital Signs (Most Recent):  Temp: 98.2 °F (36.8 °C) (03/16/23 1625)  Pulse: 86 (03/16/23 1625)  Resp: 16 (03/16/23 1625)  BP: (!) 173/90 (03/16/23 1625)  SpO2: 98 % (03/16/23 1625)   Vital Signs (24h Range):  Temp:  [98.2 °F (36.8 °C)] 98.2 °F (36.8 °C)  Pulse:  [86] 86  Resp:  [16] 16  SpO2:  [98 %] 98 %  BP: (173)/(90) 173/90     Weight: 82.6 kg (182 lb)  Body mass index is 24.68 kg/m².    Physical Exam    Patient is alert orient x3.  He is with his wife at the bedside.  Cranial nerves 2-12 are intact.  No JVD.  He has a emesis basin next to him with clear secretions.  He is regular rate and rhythm grade 2 systolic murmur.  No rubs gallops otherwise.  Abdomen is soft nontender nondistended no clubbing cyanosis or edema bilateral lower extremities.        Significant Labs: All pertinent labs within the past 24 hours have been reviewed.    Significant Imaging: I have reviewed all pertinent imaging results/findings within the past 24 hours.    Assessment/Plan:     * Foreign body in esophagus  Will place the patient NPO of course and attempting to bring him to surgery this evening for food bolus removal.          HTN (hypertension)  Monitor and Tx accordingly       VTE Risk Mitigation (From admission, onward)          Ordered     Place NAGI hose  Until discontinued         03/16/23 1936     IP VTE LOW RISK PATIENT  Once         03/16/23 1936                   On 03/16/2023, patient should be placed in hospital observation services under Dr Carreon's care.        Peter Greco III, MD  Department of Hospital Medicine  [facility] - Emergency Dept

## 2023-03-17 NOTE — ANESTHESIA PROCEDURE NOTES
Intubation    Date/Time: 3/16/2023 10:04 PM  Performed by: Brett Mccracken CRNA  Authorized by: Ortiz Hobson DO     Intubation:     Induction:  Rapid sequence induction    Intubated:  Postinduction    Mask Ventilation:  Not attempted    Attempts:  1    Attempted By:  CRNA    Method of Intubation:  Direct    Blade:  Sears 2    Laryngeal View Grade: Grade I - full view of cords      Difficult Airway Encountered?: No      Complications:  None    Airway Device:  Oral endotracheal tube    Airway Device Size:  7.5    Style/Cuff Inflation:  Cuffed (inflated to minimal occlusive pressure)    Inflation Amount (mL):  6    Tube secured:  21    Secured at:  The lips    Placement Verified By:  Capnometry and Colorimetric ETCO2 device    Complicating Factors:  None    Findings Post-Intubation:  BS equal bilateral and atraumatic/condition of teeth unchanged

## 2023-03-17 NOTE — SUBJECTIVE & OBJECTIVE
Past Medical History:   Diagnosis Date    GERD (gastroesophageal reflux disease)     Hypertension        Past Surgical History:   Procedure Laterality Date    CATARACT EXTRACTION W/ INTRAOCULAR LENS  IMPLANT, BILATERAL Bilateral     6-7 YEARS AGO    EYE SURGERY         Review of patient's allergies indicates:   Allergen Reactions    Venom-wasp Hives       No current facility-administered medications on file prior to encounter.     Current Outpatient Medications on File Prior to Encounter   Medication Sig    amLODIPine (NORVASC) 10 MG tablet Take 10 mg by mouth once daily.    doxycycline (VIBRAMYCIN) 100 MG Cap Take 1 capsule (100 mg total) by mouth 2 (two) times daily.    esomeprazole magnesium (NEXIUM) 10 mg suspension Take 10 mg by mouth before breakfast.    fenofibrate 160 MG Tab Take 145 mg by mouth once daily.     Family History       Problem Relation (Age of Onset)    Cancer Father    Hypertension Mother          Tobacco Use    Smoking status: Never    Smokeless tobacco: Never   Substance and Sexual Activity    Alcohol use: Yes     Alcohol/week: 84.0 standard drinks     Types: 84 Cans of beer per week     Comment: 12 BEERS/DAY    Drug use: Never    Sexual activity: Yes     Partners: Female     Review of Systems   HENT:  Positive for drooling.    Gastrointestinal:  Positive for vomiting.   Objective:     Vital Signs (Most Recent):  Temp: 98.2 °F (36.8 °C) (03/16/23 1625)  Pulse: 86 (03/16/23 1625)  Resp: 16 (03/16/23 1625)  BP: (!) 173/90 (03/16/23 1625)  SpO2: 98 % (03/16/23 1625)   Vital Signs (24h Range):  Temp:  [98.2 °F (36.8 °C)] 98.2 °F (36.8 °C)  Pulse:  [86] 86  Resp:  [16] 16  SpO2:  [98 %] 98 %  BP: (173)/(90) 173/90     Weight: 82.6 kg (182 lb)  Body mass index is 24.68 kg/m².    Physical Exam    Patient is alert orient x3.  He is with his wife at the bedside.  Cranial nerves 2-12 are intact.  No JVD.  He has a emesis basin next to him with clear secretions.  He is regular rate and rhythm grade 2  systolic murmur.  No rubs gallops otherwise.  Abdomen is soft nontender nondistended no clubbing cyanosis or edema bilateral lower extremities.        Significant Labs: All pertinent labs within the past 24 hours have been reviewed.    Significant Imaging: I have reviewed all pertinent imaging results/findings within the past 24 hours.

## 2023-03-17 NOTE — ANESTHESIA PREPROCEDURE EVALUATION
03/16/2023  Joshua Randle is a 62 y.o., male.      Pre-op Assessment    I have reviewed the Patient Summary Reports.     I have reviewed the Nursing Notes. I have reviewed the NPO Status.   I have reviewed the Medications.     Review of Systems  Anesthesia Hx:  No problems with previous Anesthesia  History of prior surgery of interest to airway management or planning: Denies Family Hx of Anesthesia complications.   Denies Personal Hx of Anesthesia complications.   Social:  Alcohol Use    Hematology/Oncology:  Hematology Normal   Oncology Normal     EENT/Dental:EENT/Dental Normal   Cardiovascular:   Hypertension EF-65%, mild AR   Pulmonary:  Pulmonary Normal    Renal/:   Chronic Renal Disease, CKD    Hepatic/GI:   GERD    Musculoskeletal:  Musculoskeletal Normal    Neurological:  Neurology Normal    Endocrine:  Endocrine Normal    Dermatological:  Skin Normal    Psych:  Psychiatric Normal           Physical Exam  General: Well nourished, Cooperative, Alert and Oriented    Airway:  Mallampati: II   Mouth Opening: Normal  TM Distance: Normal  Neck ROM: Normal ROM    Dental:  Intact    Chest/Lungs:  Clear to auscultation, Normal Respiratory Rate    Heart:  Rate: Normal  Rhythm: Regular Rhythm    Abdomen:  Normal, Soft, Nontender        Anesthesia Plan  Type of Anesthesia, risks & benefits discussed:    Anesthesia Type: Gen ETT  Intra-op Monitoring Plan: Standard ASA Monitors  Post Op Pain Control Plan: multimodal analgesia  Induction:  IV  Airway Plan: Direct  Informed Consent: Informed consent signed with the Patient and all parties understand the risks and agree with anesthesia plan.  All questions answered. Patient consented to blood products? Yes  ASA Score: 2 Emergent  Day of Surgery Review of History & Physical: I have interviewed and examined the patient. I have reviewed the patient's H&P dated:      Ready For Surgery From Anesthesia Perspective.     .

## 2023-03-17 NOTE — HPI
This is a 62-year-old white male with a history of reflux and daily alcohol use proximally 6-12 beers per day), who went to a Mexican food restaurant [employer] last night and had 3 bites of Carnitas and felt like something was stuck.  Since then it has been more than 24 hours and he has not been able to get anything else down.  He could not finish his meal last night.  He has been salivating and not able to swallow his own secretions.    We are admitting him to the hospital and urgent evaluation for food bolus removal.  He admits that he has had this happen over the last couple of months but no predictable pattern has occurred.      Chest CT thus far does not show any ominous other competing etiologies.  Just a food bolus in the distal 3rd.

## 2023-03-17 NOTE — OP NOTE
[facility]  Medical Surgical Unit  Operative Note    Date of Procedure: 3/16/2023     Procedure: Procedure(s) (LRB):  EGD, WITH FOREIGN BODY REMOVAL (N/A)   EGD with food bolus removal with Endo basket/net    Surgeon(s) and Role:     * Peter Greco III, MD - Primary    Assisting Surgeon: None    Pre-Operative Diagnosis: Foreign body in esophagus, initial encounter [T18.108A]  Distal esophageal food bolus obstruction    Post-Operative Diagnosis: Post-Op Diagnosis Codes:     * Foreign body in esophagus, initial encounter [T18.108A]  Distal esophageal food bolus obstruction removal with combination and a basket/and does not  Moderate chronic gastritis   Status post H pylori for gastric antrum for H pylori  Status post distal and mid esophagus rule out eosinophilic esophagitis    Endoscopic Specimens:  ID Type Source Tests Collected by Time Destination   A : gastric antrum biopsy R/O H Pylori Tissue Antrum SPECIMEN TO PATHOLOGY Peter Greco III, MD 3/16/2023 2316    B : biopsy of distal esophagus R/O eosinophilic esphagitis Tissue Esophagus SPECIMEN TO PATHOLOGY Peter Greco III, MD 3/16/2023 2320    C : biopsy mid esophagus R/O Eosinophilicesophagitis Tissue Esophagus SPECIMEN TO PATHOLOGY Peter Greco III, MD 3/16/2023 2321          Anesthesia: General    Consent:  Patient was consented for the procedure at my office.  The risks and benefits of procedure explained in detail.  They were willing to undergo those risks.    HPI & Operative Findings (including complications, if any):  62-year-old white male was eating at a Mexican restaurant [employer] eating some Pork Carnitas.  He was not been able to swallow since then.  He was salivating.  I summoned emergency room this evening for Dr. Carreon and admitted him on this basis we went to endoscopy since then been more than 24 hours with obstruction.      Patient was consented for the procedure in ER.  Risks and benefits explained to him here.   Florencio Fields CRNA present.    The patient was brought down to the operating room suite.  General anesthesia was undertaken.  Please see CRNA notes.  1st assistant was read, and also Pietro.      The Olympus gastroscope was then lubricated inserted the posterior oropharynx down to the posterior oropharyngeal area were otherwise no abnormalities noted.  There were no obvious signs of any masses or tumors however, there was a significant amount of food and then piece male typical of food bolus that was distally obstructing transit of the bolus into the stomach.  Numerous rounds of piecemeal removal with combination Endo net and endovascular removal were a undertaken.  Eventually I was able to remove enough of the bolus to get to the distal esophageal junction.  The distal esophageal junction did not seem to be still overly stenosis.  It was widely patent once I was able to get scope to it.  However I was concerned that the patient may not have good peristalsis in this area.  The scope was then used to evaluate the rest of the stomach and duodenum.      Stomach showed moderate chronic gastritis.  The duodenum was within normal limits and swept all 4 areas.  Biopsies in the gastric antrum for H pylori jar 1.  The greater lesser curvatures fundic in cardiac regions and angularis all showed moderate chronic gastritis.  No hiatal hernias and retroflexion.  No sort of IA emanating or surrounding tumors to the inferior prospective of GE junction.     The scope was then turned out of retroflexion the GE junction was examined again.  I then noted that the patient had somewhat poor peristalsis.  Could determine whether this was just due to esophageal column with the distention is undergone last 24 hours versus could the patient have eosinophilic esophagitis which could be decreased as peristalsis.  We took biopsies of the GE junction rhythm in to Qamar Reveles. Respectively the midesophagus round 40 and 15 cm respectively.    Of note  note, I was able to wash the rest of the flank he has aspects of the bolus down the stomach and aspirated out liquid.  Then biopsies above in the esophageal region were taken.      Patient was then allowed to wake from anesthesia and then recovered without any complications to my knowledge.        Discussion:     I did not dilate I do not doubt that the patient's lower esophageal sphincter because of the irritation that was around the sphincter due to food bolus.  I would like to watch him for about a week and see if we can then dilate him shortly thereafter I if he shows signs.  He normally said that he was in the last couple of months, not having any dysphagia.  Perhaps he just got a bite of the wrongcarnita?    Would consider putting on clear liquids throughout the rest of the night and then getting upper GI series tomorrow if he is tolerating liquids well.  I would like to see his swallowing mechanisms and action.  I will discuss a further Dr. Carreon.  After he does that he can likely go home and I would put him on a of a surgical liquid diet over the weekend and to ensure that he is digesting his food properly.    Again not plan on likely be scoping him within 2 4 weeks pending possible dilation with results obtained tomorrow from the upper GI series.      Blood Loss (EBL): * No values recorded between 3/16/2023 10:15 PM and 3/16/2023 11:37 PM *           Implants: * No implants in log *    Specimens:   Specimen (24h ago, onward)       Start     Ordered    03/16/23 2317  Specimen to Pathology  RELEASE UPON ORDERING        References:    Click here for ordering Quick Tip   Question:  Release to patient  Answer:  Immediate    03/16/23 2317                            Condition: Stable for Discharge    Disposition: Home or Self Care        Discharge Note    OUTCOME: Patient tolerated treatment/procedure well without complication and is now ready for discharge.    DISPOSITION: Home or Self Care    FINAL DIAGNOSIS:   Foreign body in esophagus    FOLLOWUP: Follow up in clinic in 1-2 weeks to review biopsies    DISCHARGE INSTRUCTIONS:  No discharge procedures on file.

## 2023-03-17 NOTE — ANESTHESIA POSTPROCEDURE EVALUATION
Anesthesia Post Evaluation    Patient: Joshua Randle    Procedure(s) Performed: Procedure(s) (LRB):  EGD, WITH FOREIGN BODY REMOVAL (N/A)    Final Anesthesia Type: general      Patient location during evaluation: PACU  Patient participation: Yes- Able to Participate  Level of consciousness: awake and alert  Post-procedure vital signs: reviewed and stable  Pain management: adequate  Airway patency: patent  JACEY mitigation strategies: Multimodal analgesia, Extubation while patient is awake and Verification of full reversal of neuromuscular block  PONV status at discharge: No PONV  Anesthetic complications: no      Cardiovascular status: blood pressure returned to baseline  Respiratory status: unassisted  Hydration status: euvolemic  Follow-up not needed.          Vitals Value Taken Time   /86 03/16/23 2117   Temp 37 °C (98.6 °F) 03/16/23 2117   Pulse 63 03/16/23 2117   Resp 16 03/16/23 2037   SpO2 97 % 03/16/23 2117         No case tracking events are documented in the log.      Pain/Madiha Score: No data recorded

## 2023-03-17 NOTE — H&P
[facility]  Emergency Dept  [facility] Medicine  History & Physical    Patient Name: Joshua Randle  MRN: [id number]  Patient Class: IP- Inpatient  Admission Date: 3/16/2023  Attending Physician: Peter Greco III, MD   Primary Care Provider: Rebecca Carreon MD         Patient information was obtained from patient, spouse/SO and ER records.     Subjective:     Principal Problem:Foreign body in esophagus    Chief Complaint:   Chief Complaint   Patient presents with    Dysphagia     Food bolus stuck in throat   started at supper last night        HPI: This is a 62-year-old white male with a history of reflux and daily alcohol use proximally 6-12 beers per day), who went to a Mexican food restaurant [employer] last night and had 3 bites of Carnitas and felt like something was stuck.  Since then it has been more than 24 hours and he has not been able to get anything else down.  He could not finish his meal last night.  He has been salivating and not able to swallow his own secretions.    We are admitting him to the hospital and urgent evaluation for food bolus removal.  He admits that he has had this happen over the last couple of months but no predictable pattern has occurred.      Chest CT thus far does not show any ominous other competing etiologies.  Just a food bolus in the distal 3rd.      Past Medical History:   Diagnosis Date    GERD (gastroesophageal reflux disease)     Hypertension        Past Surgical History:   Procedure Laterality Date    CATARACT EXTRACTION W/ INTRAOCULAR LENS  IMPLANT, BILATERAL Bilateral     6-7 YEARS AGO    EYE SURGERY         Review of patient's allergies indicates:   Allergen Reactions    Venom-wasp Hives       No current facility-administered medications on file prior to encounter.     Current Outpatient Medications on File Prior to Encounter   Medication Sig    amLODIPine (NORVASC) 10 MG tablet Take 10 mg by mouth once daily.    doxycycline (VIBRAMYCIN) 100 MG Cap  Take 1 capsule (100 mg total) by mouth 2 (two) times daily.    esomeprazole magnesium (NEXIUM) 10 mg suspension Take 10 mg by mouth before breakfast.    fenofibrate 160 MG Tab Take 145 mg by mouth once daily.     Family History       Problem Relation (Age of Onset)    Cancer Father    Hypertension Mother          Tobacco Use    Smoking status: Never    Smokeless tobacco: Never   Substance and Sexual Activity    Alcohol use: Yes     Alcohol/week: 84.0 standard drinks     Types: 84 Cans of beer per week     Comment: 12 BEERS/DAY    Drug use: Never    Sexual activity: Yes     Partners: Female     Review of Systems   HENT:  Positive for drooling.    Gastrointestinal:  Positive for vomiting.   Objective:     Vital Signs (Most Recent):  Temp: 98.2 °F (36.8 °C) (03/16/23 1625)  Pulse: 86 (03/16/23 1625)  Resp: 16 (03/16/23 1625)  BP: (!) 173/90 (03/16/23 1625)  SpO2: 98 % (03/16/23 1625)   Vital Signs (24h Range):  Temp:  [98.2 °F (36.8 °C)] 98.2 °F (36.8 °C)  Pulse:  [86] 86  Resp:  [16] 16  SpO2:  [98 %] 98 %  BP: (173)/(90) 173/90     Weight: 82.6 kg (182 lb)  Body mass index is 24.68 kg/m².    Physical Exam    Patient is alert orient x3.  He is with his wife at the bedside.  Cranial nerves 2-12 are intact.  No JVD.  He has a emesis basin next to him with clear secretions.  He is regular rate and rhythm grade 2 systolic murmur.  No rubs gallops otherwise.  Abdomen is soft nontender nondistended no clubbing cyanosis or edema bilateral lower extremities.        Significant Labs: All pertinent labs within the past 24 hours have been reviewed.    Significant Imaging: I have reviewed all pertinent imaging results/findings within the past 24 hours.    Assessment/Plan:     * Foreign body in esophagus  Will place the patient NPO of course and attempting to bring him to surgery this evening for food bolus removal.          HTN (hypertension)  Monitor and Tx accordingly       VTE Risk Mitigation (From admission, onward)          Ordered     Place NAGI hose  Until discontinued         03/16/23 1936     IP VTE LOW RISK PATIENT  Once         03/16/23 1936                   On 03/16/2023, patient should be placed in hospital observation services under Dr Carreon's care.        Peter Greco III, MD  Department of Hospital Medicine  [facility] - Emergency Dept

## 2023-03-20 LAB — PSYCHE PATHOLOGY RESULT: NORMAL

## 2023-03-20 NOTE — DISCHARGE SUMMARY
[facility]  Medical Surgical Unit  Hospital Medicine  Discharge Summary      Patient Name: Joshua Randle  MRN: [id number]  KEELY: [id number]  Patient Class: OP- Observation  Admission Date: 3/16/2023  Hospital Length of Stay: 1 days  Discharge Date and Time: 3/17/2023  5:27 PM  Attending Physician: No att. providers found   Discharging Provider: Rebecca Carreon MD  Primary Care Provider: Rebecca Carreon MD    Primary Care Team: Networked reference to record PCT     HPI:   This is a 62-year-old white male with a history of reflux and daily alcohol use proximally 6-12 beers per day), who went to a Mexican food restaurant [employer] last night and had 3 bites of Carnitas and felt like something was stuck.  Since then it has been more than 24 hours and he has not been able to get anything else down.  He could not finish his meal last night.  He has been salivating and not able to swallow his own secretions.    We are admitting him to the hospital and urgent evaluation for food bolus removal.  He admits that he has had this happen over the last couple of months but no predictable pattern has occurred.      Chest CT thus far does not show any ominous other competing etiologies.  Just a food bolus in the distal 3rd.      Procedure(s) (LRB):  EGD, WITH FOREIGN BODY REMOVAL (N/A)      Hospital Course:   Admit  1. Food bolus  lodged  2. Gerd  3. Htn  4. Gout  5. Etoh dependence    Discharge  1.  Food bolus removed w  scope   2. Gerd , chronic  will  cont ppi   3. Esopahgeal  narrowing , will require dilitation   4. Htn  5. Gout   6. Etoh dependence  62  year old  wm w a  h xof   swallowing  difficulty   Presenting w a  lodged  food  bolus.  Dr aceves took him to OR  and took    Out the  food  bolus.   He   was  very relieved.  Ordered   swallow  study   The   swallow study showed  shatski ring.   Will  return in a  week or  2   for   a  repeat  egd  w  dilatation.   Dc on  bid  ppi.   Allopuirnol  cont.  And cont   his bp  meds  Rtc in1   weeks  w me and dr aceves in 2  weeks.          Goals of Care Treatment Preferences:  Code Status: Full Code      Consults:     No new Assessment & Plan notes have been filed under this hospital service since the last note was generated.  Service: Hospital Medicine    Final Active Diagnoses:    Diagnosis Date Noted POA    HTN (hypertension) [I10] 03/16/2023 Yes    Dysphagia [R13.10] 03/16/2023 Yes      Problems Resolved During this Admission:    Diagnosis Date Noted Date Resolved POA    PRINCIPAL PROBLEM:  Foreign body in esophagus [T18.108A] 03/16/2023 03/16/2023 Yes       Discharged Condition: good    Disposition: Home or Self Care    Follow Up:   Follow-up Information     Peter Aceves III, MD Follow up in 1 week(s).    Specialty: Internal Medicine  Contact information:  [address]  [facility] [address]  [phone]             Rebecca Carreon MD Follow up in 1 week(s).    Specialty: Family Medicine  Contact information:  [address]  [facility] [address]  [phone]                       Patient Instructions:      Notify your health care provider if you experience any of the following:  temperature >100.4     Notify your health care provider if you experience any of the following:  severe uncontrolled pain     Notify your health care provider if you experience any of the following:  difficulty breathing or increased cough     Activity as tolerated       Significant Diagnostic Studies: Labs: BMP: No results for input(s): GLU, NA, K, CL, CO2, BUN, CREATININE, CALCIUM, MG in the last 48 hours.    Pending Diagnostic Studies:     Procedure Component Value Units Date/Time    Specimen to Pathology [[id number]] Collected: 03/16/23 1992    Order Status: Sent Lab Status: In process Updated: 03/17/23 2558    Specimen: Tissue from Antrum, Tissue from Esophagus, Tissue from Esophagus          Medications:  Reconciled Home Medications:      Medication List      CHANGE how  you take these medications    esomeprazole magnesium 10 mg suspension  Commonly known as: NEXIUM  Take 10 mg by mouth 2 (two) times daily.  What changed: when to take this        CONTINUE taking these medications    amLODIPine 10 MG tablet  Commonly known as: NORVASC  Take 10 mg by mouth once daily.     fenofibrate 160 MG Tab  Take 145 mg by mouth once daily.        STOP taking these medications    doxycycline 100 MG Cap  Commonly known as: VIBRAMYCIN            Indwelling Lines/Drains at time of discharge:   Lines/Drains/Airways     None                 Time spent on the discharge of patient: 34   minutes         Rebecca Carreon MD  Department of Hospital Medicine  [facility] - Medical Surgical Unit

## 2023-03-20 NOTE — HOSPITAL COURSE
Admit  Food bolus  lodged  Gerd  Htn  Gout  Etoh dependence    Discharge   Food bolus removed w  scope   Gerd , chronic  will  cont ppi   Esopahgeal  narrowing , will require dilitation   Htn  Gout   Etoh dependence  62  year old  wm w a  h xof   swallowing  difficulty   Presenting w a  lodged  food  bolus.  Dr aceves took him to OR  and took    Out the  food  bolus.   He   was  very relieved.  Ordered   swallow  study   The   swallow study showed  shatski ring.   Will  return in a  week or  2   for   a  repeat  egd  w  dilatation.   Dc on  bid  ppi.   Allopuirnol  cont.  And cont  his bp  meds  Rtc in1   weeks  w me and dr aceves in 2  weeks.

## 2023-03-21 ENCOUNTER — CLINICAL SUPPORT (OUTPATIENT)
Dept: RESPIRATORY THERAPY | Facility: HOSPITAL | Age: 63
End: 2023-03-21
Attending: INTERNAL MEDICINE
Payer: COMMERCIAL

## 2023-03-21 DIAGNOSIS — Z01.818 PRE-OP EVALUATION: Primary | ICD-10-CM

## 2023-03-21 DIAGNOSIS — Z01.818 PRE-OP EVALUATION: ICD-10-CM

## 2023-03-21 PROCEDURE — 93010 EKG 12-LEAD: ICD-10-PCS | Mod: ,,, | Performed by: INTERNAL MEDICINE

## 2023-03-21 PROCEDURE — 93005 ELECTROCARDIOGRAM TRACING: CPT

## 2023-03-21 PROCEDURE — 93010 ELECTROCARDIOGRAM REPORT: CPT | Mod: ,,, | Performed by: INTERNAL MEDICINE

## 2023-03-22 ENCOUNTER — ANESTHESIA (OUTPATIENT)
Dept: SURGERY | Facility: HOSPITAL | Age: 63
End: 2023-03-22
Payer: COMMERCIAL

## 2023-03-22 ENCOUNTER — ANESTHESIA EVENT (OUTPATIENT)
Dept: SURGERY | Facility: HOSPITAL | Age: 63
End: 2023-03-22
Payer: COMMERCIAL

## 2023-03-22 ENCOUNTER — HOSPITAL ENCOUNTER (OUTPATIENT)
Facility: HOSPITAL | Age: 63
Discharge: HOME | End: 2023-03-22
Attending: INTERNAL MEDICINE | Admitting: INTERNAL MEDICINE
Payer: COMMERCIAL

## 2023-03-22 VITALS
SYSTOLIC BLOOD PRESSURE: 150 MMHG | TEMPERATURE: 98 F | OXYGEN SATURATION: 98 % | WEIGHT: 182 LBS | HEART RATE: 53 BPM | DIASTOLIC BLOOD PRESSURE: 80 MMHG | RESPIRATION RATE: 18 BRPM | BODY MASS INDEX: 24.68 KG/M2

## 2023-03-22 DIAGNOSIS — R13.19 ESOPHAGEAL DYSPHAGIA: Primary | ICD-10-CM

## 2023-03-22 DIAGNOSIS — K22.70 BARRETT'S ESOPHAGUS: ICD-10-CM

## 2023-03-22 PROCEDURE — 63600175 PHARM REV CODE 636 W HCPCS: Performed by: NURSE ANESTHETIST, CERTIFIED REGISTERED

## 2023-03-22 PROCEDURE — 43248 EGD GUIDE WIRE INSERTION: CPT | Performed by: INTERNAL MEDICINE

## 2023-03-22 PROCEDURE — C1726 CATH, BAL DIL, NON-VASCULAR: HCPCS | Performed by: INTERNAL MEDICINE

## 2023-03-22 PROCEDURE — 63600175 PHARM REV CODE 636 W HCPCS: Performed by: ANESTHESIOLOGY

## 2023-03-22 PROCEDURE — 25000003 PHARM REV CODE 250: Performed by: NURSE ANESTHETIST, CERTIFIED REGISTERED

## 2023-03-22 PROCEDURE — 37000009 HC ANESTHESIA EA ADD 15 MINS: Performed by: INTERNAL MEDICINE

## 2023-03-22 PROCEDURE — 37000008 HC ANESTHESIA 1ST 15 MINUTES: Performed by: INTERNAL MEDICINE

## 2023-03-22 RX ORDER — SODIUM CHLORIDE, SODIUM LACTATE, POTASSIUM CHLORIDE, CALCIUM CHLORIDE 600; 310; 30; 20 MG/100ML; MG/100ML; MG/100ML; MG/100ML
INJECTION, SOLUTION INTRAVENOUS CONTINUOUS
Status: DISCONTINUED | OUTPATIENT
Start: 2023-03-22 | End: 2023-03-22 | Stop reason: HOSPADM

## 2023-03-22 RX ORDER — FENTANYL CITRATE 50 UG/ML
INJECTION, SOLUTION INTRAMUSCULAR; INTRAVENOUS
Status: DISCONTINUED | OUTPATIENT
Start: 2023-03-22 | End: 2023-03-22

## 2023-03-22 RX ORDER — PROPOFOL 10 MG/ML
VIAL (ML) INTRAVENOUS
Status: DISCONTINUED | OUTPATIENT
Start: 2023-03-22 | End: 2023-03-22

## 2023-03-22 RX ORDER — LIDOCAINE HYDROCHLORIDE 20 MG/ML
INJECTION INTRAVENOUS
Status: DISCONTINUED | OUTPATIENT
Start: 2023-03-22 | End: 2023-03-22

## 2023-03-22 RX ADMIN — FENTANYL CITRATE 100 MCG: 50 INJECTION, SOLUTION INTRAMUSCULAR; INTRAVENOUS at 06:03

## 2023-03-22 RX ADMIN — PROPOFOL 50 MG: 10 INJECTION, EMULSION INTRAVENOUS at 06:03

## 2023-03-22 RX ADMIN — SODIUM CHLORIDE, POTASSIUM CHLORIDE, SODIUM LACTATE AND CALCIUM CHLORIDE: 600; 310; 30; 20 INJECTION, SOLUTION INTRAVENOUS at 05:03

## 2023-03-22 RX ADMIN — PROPOFOL 100 MG: 10 INJECTION, EMULSION INTRAVENOUS at 06:03

## 2023-03-22 RX ADMIN — LIDOCAINE HYDROCHLORIDE 100 MG: 20 INJECTION, SOLUTION INTRAVENOUS at 06:03

## 2023-03-22 NOTE — ANESTHESIA PREPROCEDURE EVALUATION
03/22/2023  Joshua Randle is a 62 y.o., male.      Pre-op Assessment    I have reviewed the Patient Summary Reports.     I have reviewed the Nursing Notes. I have reviewed the NPO Status.   I have reviewed the Medications.     Review of Systems  Anesthesia Hx:  Denies Family Hx of Anesthesia complications.   Denies Personal Hx of Anesthesia complications.   Social:  Alcohol Use, Non-Smoker    Hematology/Oncology:  Hematology Normal   Oncology Normal     EENT/Dental:EENT/Dental Normal   Cardiovascular:   Hypertension, well controlled ECG has been reviewed.    Pulmonary:  Pulmonary Normal    Renal/:  Renal/ Normal     Hepatic/GI:   GERD, well controlled    Musculoskeletal:  Musculoskeletal Normal    Neurological:  Neurology Normal    Endocrine:  Endocrine Normal    Dermatological:  Skin Normal    Psych:  Psychiatric Normal           Physical Exam  General: Cooperative, Alert and Oriented    Airway:  Mallampati: II   Mouth Opening: Normal  TM Distance: Normal  Tongue: Normal  Neck ROM: Normal ROM    Dental:  Intact        Anesthesia Plan  Type of Anesthesia, risks & benefits discussed:    Anesthesia Type: Gen Natural Airway  Intra-op Monitoring Plan: Standard ASA Monitors  Post Op Pain Control Plan:   (medical reason for not using multimodal pain management)  Induction:  IV  Informed Consent: Informed consent signed with the Patient and all parties understand the risks and agree with anesthesia plan.  All questions answered. Patient consented to blood products? Yes  ASA Score: 2    Ready For Surgery From Anesthesia Perspective.     .

## 2023-03-22 NOTE — OP NOTE
[facility] - Periop Services  Operative Note    Date of Procedure: 3/22/2023     Procedure: Procedure(s) (LRB):  EGD, WITH BALLOON DILATION (N/A)   EGD with CRE dilation    Surgeon(s) and Role:     * Peter Greco III, MD - Primary    Assisting Surgeon: None    Pre-Operative Diagnosis: Esophageal foreign body, subsequent encounter [T18.108D]  Chronic gastritis without bleeding, unspecified gastritis type [K29.50]  Esophageal stricture distal  Castañeda's esophagus    Post-Operative Diagnosis: Post-Op Diagnosis Codes:     * Esophageal foreign body, subsequent encounter [T18.108D]     * Chronic gastritis without bleeding, unspecified gastritis type [K29.50]     * Castañeda's esophagus without dysplasia [K22.70]  Successful EGD dilation distal esophageal stricture to 54 Latvian.  Moderate gastritis  Endoscopic Specimens:  * No specimens in log *      Anesthesia: General    Consent:  Patient was consented for the procedure at my office.  The risks and benefits of procedure explained in detail.  They were willing to undergo those risks.    HPI & Operative Findings (including complications, if any):  This is a 62-year-old white male upon whom I performed endoscopy urgently last week for food bolus entrapment in the distal esophagus.  I kept him on a full liquid diet for the next week.  The area was friable at the time of the want to give it time to heal up.      He was brought into the endoscopy suite today.  Laid in a semi left lateral decubitus position.  Florencio Estrella CRNA present.  Please see his documentation for medications.      After the patient was anesthetized and had the bite block placed, the Olympus gastroscope was then lubricated inserted down the posterior oropharynx were upon I was able to get the scope down into the stomach easily.  I cleared the duodenum and the pylorus ensure there was no blockage.  These were patent.  Again it showed mild duodenitis and moderate chronic gastritis.      The  scope was pulled back a 15/16.5/18 cook continuous radial expander dilator balloon series was then used to expand up to 54 Martiniquais.  The balloons were inflated and then deflated.  There was mild rupture of membranes at 54 Martiniquais.    The dilator was then pulled in retrograde into the scope and scope was removed.  The patient good peristalsis in the esophagus no signs of trauma or injury to the distal esophageal region.      Patient tolerated the procedure well without complications.    Discussion:    Will get a stat chest x-ray ensure there is no mediastinal issues or to the esophagus itself.  Will then advanced to liquids today and soft tomorrow.  Will plan on following up the patient and make sure he is on PPI and reflux recommendations.    Will consider based off of his symptomatology repeating dilation in the next 8-12 weeks if necessary.  Less likely considering that we were able to dilated to 54 today.    Blood Loss (EBL): * No values recorded between 3/22/2023  6:23 AM and 3/22/2023  7:01 AM *           Implants: * No implants in log *    Specimens:   Specimen (24h ago, onward)      None                    Condition: Stable for Discharge    Disposition: Home or Self Care        Discharge Note    OUTCOME: Patient tolerated treatment/procedure well without complication and is now ready for discharge.    DISPOSITION: Home or Self Care    FINAL DIAGNOSIS:  <principal problem not specified>    FOLLOWUP: Follow up in clinic in 1-2 weeks to review biopsies    DISCHARGE INSTRUCTIONS:  No discharge procedures on file.

## 2023-03-22 NOTE — ANESTHESIA POSTPROCEDURE EVALUATION
Anesthesia Post Evaluation    Patient: Joshua Randle    Procedure(s) Performed: Procedure(s) (LRB):  EGD, WITH BALLOON DILATION (N/A)    Final Anesthesia Type: general      Patient location during evaluation: OPS  Patient participation: Yes- Able to Participate  Level of consciousness: awake and alert  Post-procedure vital signs: reviewed and stable  Pain management: adequate  Airway patency: patent  JACEY mitigation strategies: Multimodal analgesia  PONV status at discharge: No PONV  Anesthetic complications: no      Cardiovascular status: hemodynamically stable  Respiratory status: unassisted, spontaneous ventilation and room air  Hydration status: euvolemic  Follow-up not needed.          Vitals  Taken Time   BP  03/22/23 0702   Temp  03/22/23 0702   Pulse  03/22/23 0702   Resp  03/22/23 0702   SpO2  03/22/23 0702         No case tracking events are documented in the log.      Pain/Madiha Score: No data recorded

## 2023-03-22 NOTE — DISCHARGE INSTRUCTIONS
INSTRUCTIONS  AFTER A COLONOSCOPY/EGD                                                                                    NO DRIVING X 24 HOURS. NOTIFY YOUR DOCTOR WITH                                                                                 ABDOMINAL PAIN UNRELIEVED BY  PASSING GAS,                                                                           FEVER WITHIN 24 HOURS, ARE LARGE AMOUNT OF BLEEDING.    [FreeTextEntry1] : I, Alana Duncan, acted solely as a scribe for Dr. Mike Zapata on this date. 01/07/2020.

## 2024-04-26 DIAGNOSIS — Z12.11 SCREEN FOR COLON CANCER: Primary | ICD-10-CM

## 2024-04-29 ENCOUNTER — CLINICAL SUPPORT (OUTPATIENT)
Dept: RESPIRATORY THERAPY | Facility: HOSPITAL | Age: 64
End: 2024-04-29
Attending: INTERNAL MEDICINE
Payer: COMMERCIAL

## 2024-04-29 DIAGNOSIS — Z12.11 SCREEN FOR COLON CANCER: ICD-10-CM

## 2024-04-29 LAB
OHS QRS DURATION: 78 MS
OHS QTC CALCULATION: 395 MS

## 2024-04-29 PROCEDURE — 93005 ELECTROCARDIOGRAM TRACING: CPT

## 2024-04-29 PROCEDURE — 93010 ELECTROCARDIOGRAM REPORT: CPT | Mod: ,,, | Performed by: INTERNAL MEDICINE

## 2024-04-29 RX ORDER — OMEPRAZOLE 40 MG/1
40 CAPSULE, DELAYED RELEASE ORAL EVERY MORNING
COMMUNITY
Start: 2024-04-09

## 2024-04-29 RX ORDER — FENOFIBRATE 145 MG/1
145 TABLET, FILM COATED ORAL EVERY MORNING
COMMUNITY
Start: 2024-01-29

## 2024-04-29 NOTE — DISCHARGE INSTRUCTIONS
Follow prep on Wednesday. Clear liquids only. Nothing by mouth after midnight. Take Amlodipine AM of procedure with small sip of water.         INSTRUCTIONS  AFTER A COLONOSCOPY/EGD    NO DRIVING X 24 HOURS. NOTIFY YOUR DOCTOR WITH     ABDOMINAL PAIN UNRELIEVED BY  PASSING GAS,   FEVER WITHIN 24 HOURS, OR LARGE AMOUNT OF BLEEDING.

## 2024-05-01 ENCOUNTER — ANESTHESIA EVENT (OUTPATIENT)
Dept: SURGERY | Facility: HOSPITAL | Age: 64
End: 2024-05-01
Payer: COMMERCIAL

## 2024-05-01 NOTE — ANESTHESIA PREPROCEDURE EVALUATION
05/01/2024  Joshua Randle is a 63 y.o., male.      Pre-op Assessment    I have reviewed the Patient Summary Reports.     I have reviewed the Nursing Notes. I have reviewed the NPO Status.   I have reviewed the Medications.     Review of Systems  Anesthesia Hx:             Denies Family Hx of Anesthesia complications.    Denies Personal Hx of Anesthesia complications.                    Social:  Smoker, Alcohol Use       Hematology/Oncology:  Hematology Normal   Oncology Normal                                   EENT/Dental:  EENT/Dental Normal           Cardiovascular:     Hypertension, well controlled              ECG has been reviewed.                          Pulmonary:  Pulmonary Normal                       Renal/:  Renal/ Normal                 Hepatic/GI:     GERD, well controlled             Musculoskeletal:  Musculoskeletal Normal                Neurological:  Neurology Normal                                      Endocrine:  Endocrine Normal            Dermatological:  Skin Normal    Psych:  Psychiatric Normal                    Physical Exam  General: Cooperative, Alert and Oriented    Airway:  Mallampati: II   Mouth Opening: Normal  TM Distance: Normal  Tongue: Normal  Neck ROM: Normal ROM    Dental:  Intact        Anesthesia Plan  Type of Anesthesia, risks & benefits discussed:    Anesthesia Type: Gen Natural Airway  Intra-op Monitoring Plan: Standard ASA Monitors  Post Op Pain Control Plan:   (medical reason for not using multimodal pain management)  Induction:  IV  Informed Consent: Informed consent signed with the Patient and all parties understand the risks and agree with anesthesia plan.  All questions answered. Patient consented to blood products? Yes  ASA Score: 2    Ready For Surgery From Anesthesia Perspective.     .

## 2024-05-02 ENCOUNTER — HOSPITAL ENCOUNTER (OUTPATIENT)
Facility: HOSPITAL | Age: 64
Discharge: HOME | End: 2024-05-02
Attending: INTERNAL MEDICINE | Admitting: INTERNAL MEDICINE
Payer: COMMERCIAL

## 2024-05-02 ENCOUNTER — ANESTHESIA (OUTPATIENT)
Dept: SURGERY | Facility: HOSPITAL | Age: 64
End: 2024-05-02
Payer: COMMERCIAL

## 2024-05-02 VITALS
WEIGHT: 192.88 LBS | SYSTOLIC BLOOD PRESSURE: 162 MMHG | TEMPERATURE: 97 F | HEART RATE: 52 BPM | OXYGEN SATURATION: 98 % | RESPIRATION RATE: 18 BRPM | DIASTOLIC BLOOD PRESSURE: 81 MMHG | BODY MASS INDEX: 26.12 KG/M2 | HEIGHT: 72 IN

## 2024-05-02 DIAGNOSIS — Z12.11 SCREEN FOR COLON CANCER: ICD-10-CM

## 2024-05-02 DIAGNOSIS — Z12.11 SCREENING FOR COLON CANCER: ICD-10-CM

## 2024-05-02 PROCEDURE — 37000009 HC ANESTHESIA EA ADD 15 MINS: Performed by: INTERNAL MEDICINE

## 2024-05-02 PROCEDURE — 27201423 OPTIME MED/SURG SUP & DEVICES STERILE SUPPLY: Performed by: INTERNAL MEDICINE

## 2024-05-02 PROCEDURE — 25000003 PHARM REV CODE 250: Performed by: NURSE ANESTHETIST, CERTIFIED REGISTERED

## 2024-05-02 PROCEDURE — 63600175 PHARM REV CODE 636 W HCPCS: Performed by: ANESTHESIOLOGY

## 2024-05-02 PROCEDURE — D9220A PRA ANESTHESIA: Mod: 33,,, | Performed by: NURSE ANESTHETIST, CERTIFIED REGISTERED

## 2024-05-02 PROCEDURE — 63600175 PHARM REV CODE 636 W HCPCS: Performed by: NURSE ANESTHETIST, CERTIFIED REGISTERED

## 2024-05-02 PROCEDURE — 37000008 HC ANESTHESIA 1ST 15 MINUTES: Performed by: INTERNAL MEDICINE

## 2024-05-02 PROCEDURE — 45380 COLONOSCOPY AND BIOPSY: CPT | Mod: PT | Performed by: INTERNAL MEDICINE

## 2024-05-02 RX ORDER — LIDOCAINE HYDROCHLORIDE 20 MG/ML
INJECTION INTRAVENOUS
Status: DISCONTINUED | OUTPATIENT
Start: 2024-05-02 | End: 2024-05-02

## 2024-05-02 RX ORDER — SODIUM CHLORIDE, SODIUM LACTATE, POTASSIUM CHLORIDE, CALCIUM CHLORIDE 600; 310; 30; 20 MG/100ML; MG/100ML; MG/100ML; MG/100ML
INJECTION, SOLUTION INTRAVENOUS CONTINUOUS
Status: DISCONTINUED | OUTPATIENT
Start: 2024-05-02 | End: 2024-05-02 | Stop reason: HOSPADM

## 2024-05-02 RX ORDER — FENTANYL CITRATE 50 UG/ML
INJECTION, SOLUTION INTRAMUSCULAR; INTRAVENOUS
Status: DISCONTINUED | OUTPATIENT
Start: 2024-05-02 | End: 2024-05-02

## 2024-05-02 RX ORDER — PROPOFOL 10 MG/ML
VIAL (ML) INTRAVENOUS
Status: DISCONTINUED | OUTPATIENT
Start: 2024-05-02 | End: 2024-05-02

## 2024-05-02 RX ADMIN — PROPOFOL 50 MG: 10 INJECTION, EMULSION INTRAVENOUS at 11:05

## 2024-05-02 RX ADMIN — PROPOFOL 100 MG: 10 INJECTION, EMULSION INTRAVENOUS at 11:05

## 2024-05-02 RX ADMIN — LIDOCAINE HYDROCHLORIDE 60 MG: 20 INJECTION, SOLUTION INTRAVENOUS at 11:05

## 2024-05-02 RX ADMIN — SODIUM CHLORIDE, POTASSIUM CHLORIDE, SODIUM LACTATE AND CALCIUM CHLORIDE: 600; 310; 30; 20 INJECTION, SOLUTION INTRAVENOUS at 10:05

## 2024-05-02 RX ADMIN — FENTANYL CITRATE 100 MCG: 50 INJECTION, SOLUTION INTRAMUSCULAR; INTRAVENOUS at 11:05

## 2024-05-02 NOTE — OP NOTE
[facility] - Periop Services  Operative Note    Date of Procedure: 5/2/2024     Procedure: Procedure(s) (LRB):  COLONOSCOPY (N/A)  COLONOSCOPY, WITH POLYPECTOMY USING HOT BIOPSY FORCEPS (N/A)   Cold polypectomy    Surgeons and Role:     * Peter Greco III, MD - Primary    Assisting Surgeon: None    Pre-Operative Diagnosis: Screen for colon cancer [Z12.11]  Screening for colon cancer    Post-Operative Diagnosis: Post-Op Diagnosis Codes:     * Screen for colon cancer [Z12.11]  2 polyps of the cecum status post cold biopsy piecemeal removal  Suboptimal prep   This is the descending sigmoid    Endoscopic Specimens:  ID Type Source Tests Collected by Time Destination   A : A. cecal polypectomy Tissue Intestine Large, Cecum SPECIMEN TO PATHOLOGY Peter Greco III, MD 5/2/2024 1150          Anesthesia: General    Consent:  Patient was consented for the procedure at my office.  The risks and benefits of procedure explained in detail.  They were willing to undergo those risks.    HPI & Operative Findings (including complications, if any):   63-year-old white male colorectal cancer risk.  No alarm symptoms.  Last colonoscopy 50 years old.    Patient was brought down to the endoscopy room suite laid in left lateral decubitus position rectal exam is performed was within normal limits.    The Olympus colonoscope was then maneuvered all the way to the cecum cecum was visualized and photographed terminal ileum to 5 cm.  Appendix was normal however difficult to see the cecum in general because he would foamy prep.  It was very foamy.      Use the jet toe washed out as much as I could I saw too small 2-3 mm polyps in the cecum.  These were removed piecemeal fashion placed in jar A x2.  No perforation no bleed.      The scope was then pulled back 360 degree circumferential fashion there were no abnormalities noted to the ascending colon, hepatic flexure, transverse colon, splenic flexure and proximal  descending.  However, in the distal descending and sigmoid there was diverticular disease that was moderate in nature.  Rectum on retroflexion normal.  Scope was removed patient tolerated procedure well without complications.      Discussion:    Recommend follow up on these polyps next 2 weeks likely repeat colonoscopy in 5 years because of the degree of foaminess in suboptimal prep.        Blood Loss (EBL): 0 mL           Implants: * No implants in log *    Specimens:   Specimen (24h ago, onward)       Start     Ordered    05/02/24 1203  Specimen to Pathology  RELEASE UPON ORDERING        References:    Click here for ordering Quick Tip   Question:  Release to patient  Answer:  Immediate    05/02/24 1203                            Condition: Stable for Discharge    Disposition: Home or Self Care        Discharge Note    OUTCOME: Patient tolerated treatment/procedure well without complication and is now ready for discharge.    DISPOSITION: Home or Self Care    FINAL DIAGNOSIS:  <principal problem not specified>    FOLLOWUP: Follow up in clinic in 1-2 weeks to review biopsies    DISCHARGE INSTRUCTIONS:  No discharge procedures on file.

## 2024-05-02 NOTE — TRANSFER OF CARE
Anesthesia Transfer of Care Note    Patient: Joshua Randle    Procedure(s) Performed: Procedure(s) (LRB):  COLONOSCOPY (N/A)  COLONOSCOPY, WITH POLYPECTOMY USING HOT BIOPSY FORCEPS (N/A)    Patient location: PACU    Anesthesia Type: general    Transport from OR: Transported from OR on room air with adequate spontaneous ventilation    Post pain: adequate analgesia    Post assessment: no apparent anesthetic complications    Post vital signs: stable    Level of consciousness: sedated    Nausea/Vomiting: no nausea/vomiting    Complications: none    Transfer of care protocol was followed      Last vitals: Visit Vitals  BP (!) 168/95 (BP Location: Right arm, Patient Position: Lying)   Pulse (!) 57   Resp 18   Ht 6' (1.829 m)   Wt 87.5 kg (192 lb 14.4 oz)   SpO2 98%   BMI 26.16 kg/m²

## 2024-05-02 NOTE — ANESTHESIA POSTPROCEDURE EVALUATION
Anesthesia Post Evaluation    Patient: Joshua Randle    Procedure(s) Performed: Procedure(s) (LRB):  COLONOSCOPY (N/A)  COLONOSCOPY, WITH POLYPECTOMY USING HOT BIOPSY FORCEPS (N/A)    Final Anesthesia Type: general      Patient participation: Yes- Able to Participate  Level of consciousness: awake and alert  Post-procedure vital signs: reviewed and stable  Pain management: adequate  Airway patency: patent    PONV status at discharge: No PONV  Anesthetic complications: no      Cardiovascular status: blood pressure returned to baseline  Respiratory status: unassisted  Hydration status: euvolemic  Follow-up not needed.              Vitals Value Taken Time   /95 05/02/24 1002   Temp  05/02/24 1201   Pulse 57 05/02/24 1002   Resp 18 05/02/24 1002   SpO2 98 % 05/02/24 1002         No case tracking events are documented in the log.      Pain/Madiha Score: No data recorded

## 2024-05-06 LAB — PSYCHE PATHOLOGY RESULT: NORMAL

## 2024-06-07 ENCOUNTER — LAB VISIT (OUTPATIENT)
Dept: LAB | Facility: HOSPITAL | Age: 64
End: 2024-06-07
Attending: FAMILY MEDICINE
Payer: COMMERCIAL

## 2024-06-07 DIAGNOSIS — Z12.5 SPECIAL SCREENING FOR MALIGNANT NEOPLASM OF PROSTATE: ICD-10-CM

## 2024-06-07 DIAGNOSIS — Z00.00 ROUTINE GENERAL MEDICAL EXAMINATION AT A HEALTH CARE FACILITY: Primary | ICD-10-CM

## 2024-06-07 LAB
ALBUMIN SERPL-MCNC: 4.1 G/DL (ref 3.4–4.8)
ALBUMIN/GLOB SERPL: 1.1 RATIO (ref 1.1–2)
ALP SERPL-CCNC: 62 UNIT/L (ref 40–150)
ALT SERPL-CCNC: 27 UNIT/L (ref 0–55)
ANION GAP SERPL CALC-SCNC: 10 MEQ/L
AST SERPL-CCNC: 23 UNIT/L (ref 5–34)
BASOPHILS # BLD AUTO: 0.04 X10(3)/MCL
BASOPHILS NFR BLD AUTO: 1 %
BILIRUB SERPL-MCNC: 0.5 MG/DL
BUN SERPL-MCNC: 11 MG/DL (ref 8.4–25.7)
CALCIUM SERPL-MCNC: 9.3 MG/DL (ref 8.8–10)
CHLORIDE SERPL-SCNC: 107 MMOL/L (ref 98–107)
CHOLEST SERPL-MCNC: 238 MG/DL
CHOLEST/HDLC SERPL: 5 {RATIO} (ref 0–5)
CO2 SERPL-SCNC: 24 MMOL/L (ref 23–31)
CREAT SERPL-MCNC: 1.38 MG/DL (ref 0.73–1.18)
CREAT/UREA NIT SERPL: 8
EOSINOPHIL # BLD AUTO: 0.23 X10(3)/MCL (ref 0–0.9)
EOSINOPHIL NFR BLD AUTO: 6 %
ERYTHROCYTE [DISTWIDTH] IN BLOOD BY AUTOMATED COUNT: 11.9 % (ref 11.5–17)
GFR SERPLBLD CREATININE-BSD FMLA CKD-EPI: 57 ML/MIN/1.73/M2
GLOBULIN SER-MCNC: 3.7 GM/DL (ref 2.4–3.5)
GLUCOSE SERPL-MCNC: 80 MG/DL (ref 82–115)
HCT VFR BLD AUTO: 42.9 % (ref 42–52)
HDLC SERPL-MCNC: 44 MG/DL (ref 35–60)
HGB BLD-MCNC: 15.1 G/DL (ref 14–18)
IMM GRANULOCYTES # BLD AUTO: 0.02 X10(3)/MCL (ref 0–0.04)
IMM GRANULOCYTES NFR BLD AUTO: 0.5 %
LYMPHOCYTES # BLD AUTO: 1.25 X10(3)/MCL (ref 0.6–4.6)
LYMPHOCYTES NFR BLD AUTO: 32.6 %
MCH RBC QN AUTO: 30.7 PG (ref 27–31)
MCHC RBC AUTO-ENTMCNC: 35.2 G/DL (ref 33–36)
MCV RBC AUTO: 87.2 FL (ref 80–94)
MONOCYTES # BLD AUTO: 0.33 X10(3)/MCL (ref 0.1–1.3)
MONOCYTES NFR BLD AUTO: 8.6 %
NEUTROPHILS # BLD AUTO: 1.97 X10(3)/MCL (ref 2.1–9.2)
NEUTROPHILS NFR BLD AUTO: 51.3 %
PLATELET # BLD AUTO: 190 X10(3)/MCL (ref 130–400)
PMV BLD AUTO: 9.7 FL (ref 7.4–10.4)
POTASSIUM SERPL-SCNC: 3.5 MMOL/L (ref 3.5–5.1)
PROT SERPL-MCNC: 7.8 GM/DL (ref 5.8–7.6)
PSA SERPL-MCNC: 1.55 NG/ML
RBC # BLD AUTO: 4.92 X10(6)/MCL (ref 4.7–6.1)
SODIUM SERPL-SCNC: 141 MMOL/L (ref 136–145)
TRIGL SERPL-MCNC: 666 MG/DL (ref 34–140)
WBC # SPEC AUTO: 3.84 X10(3)/MCL (ref 4.5–11.5)

## 2024-06-07 PROCEDURE — 36415 COLL VENOUS BLD VENIPUNCTURE: CPT

## 2024-06-07 PROCEDURE — 80061 LIPID PANEL: CPT

## 2024-06-07 PROCEDURE — 84153 ASSAY OF PSA TOTAL: CPT

## 2024-06-07 PROCEDURE — 80053 COMPREHEN METABOLIC PANEL: CPT

## 2024-06-07 PROCEDURE — 85025 COMPLETE CBC W/AUTO DIFF WBC: CPT

## 2024-12-09 ENCOUNTER — LAB VISIT (OUTPATIENT)
Dept: LAB | Facility: HOSPITAL | Age: 64
End: 2024-12-09
Attending: FAMILY MEDICINE
Payer: COMMERCIAL

## 2024-12-09 DIAGNOSIS — I11.9 MALIGNANT HYPERTENSIVE HEART DISEASE WITHOUT HEART FAILURE: Primary | ICD-10-CM

## 2024-12-09 LAB
ALBUMIN SERPL-MCNC: 4.1 G/DL (ref 3.4–4.8)
ALBUMIN/GLOB SERPL: 1.3 RATIO (ref 1.1–2)
ALP SERPL-CCNC: 38 UNIT/L (ref 40–150)
ALT SERPL-CCNC: 18 UNIT/L (ref 0–55)
ANION GAP SERPL CALC-SCNC: 9 MEQ/L
AST SERPL-CCNC: 18 UNIT/L (ref 5–34)
BASOPHILS # BLD AUTO: 0.02 X10(3)/MCL
BASOPHILS NFR BLD AUTO: 0.4 %
BILIRUB SERPL-MCNC: 0.6 MG/DL
BUN SERPL-MCNC: 12 MG/DL (ref 8.4–25.7)
CALCIUM SERPL-MCNC: 9.5 MG/DL (ref 8.8–10)
CHLORIDE SERPL-SCNC: 108 MMOL/L (ref 98–107)
CHOLEST SERPL-MCNC: 246 MG/DL
CHOLEST/HDLC SERPL: 5 {RATIO} (ref 0–5)
CO2 SERPL-SCNC: 26 MMOL/L (ref 23–31)
CREAT SERPL-MCNC: 1.46 MG/DL (ref 0.72–1.25)
CREAT/UREA NIT SERPL: 8
EOSINOPHIL # BLD AUTO: 0.22 X10(3)/MCL (ref 0–0.9)
EOSINOPHIL NFR BLD AUTO: 4.9 %
ERYTHROCYTE [DISTWIDTH] IN BLOOD BY AUTOMATED COUNT: 11.4 % (ref 11.5–17)
GFR SERPLBLD CREATININE-BSD FMLA CKD-EPI: 53 ML/MIN/1.73/M2
GLOBULIN SER-MCNC: 3.1 GM/DL (ref 2.4–3.5)
GLUCOSE SERPL-MCNC: 103 MG/DL (ref 82–115)
HCT VFR BLD AUTO: 41.1 % (ref 42–52)
HDLC SERPL-MCNC: 47 MG/DL (ref 35–60)
HGB BLD-MCNC: 14.3 G/DL (ref 14–18)
IMM GRANULOCYTES # BLD AUTO: 0.03 X10(3)/MCL (ref 0–0.04)
IMM GRANULOCYTES NFR BLD AUTO: 0.7 %
LDLC SERPL CALC-MCNC: 161 MG/DL (ref 50–140)
LYMPHOCYTES # BLD AUTO: 1.21 X10(3)/MCL (ref 0.6–4.6)
LYMPHOCYTES NFR BLD AUTO: 26.8 %
MCH RBC QN AUTO: 30.2 PG (ref 27–31)
MCHC RBC AUTO-ENTMCNC: 34.8 G/DL (ref 33–36)
MCV RBC AUTO: 86.9 FL (ref 80–94)
MONOCYTES # BLD AUTO: 0.36 X10(3)/MCL (ref 0.1–1.3)
MONOCYTES NFR BLD AUTO: 8 %
NEUTROPHILS # BLD AUTO: 2.67 X10(3)/MCL (ref 2.1–9.2)
NEUTROPHILS NFR BLD AUTO: 59.2 %
PLATELET # BLD AUTO: 188 X10(3)/MCL (ref 130–400)
PMV BLD AUTO: 9.6 FL (ref 7.4–10.4)
POTASSIUM SERPL-SCNC: 4.1 MMOL/L (ref 3.5–5.1)
PROT SERPL-MCNC: 7.2 GM/DL (ref 5.8–7.6)
RBC # BLD AUTO: 4.73 X10(6)/MCL (ref 4.7–6.1)
SODIUM SERPL-SCNC: 143 MMOL/L (ref 136–145)
TRIGL SERPL-MCNC: 191 MG/DL (ref 34–140)
TSH SERPL-ACNC: 0.5 UIU/ML (ref 0.35–4.94)
VLDLC SERPL CALC-MCNC: 38 MG/DL
WBC # BLD AUTO: 4.51 X10(3)/MCL (ref 4.5–11.5)

## 2024-12-09 PROCEDURE — 85025 COMPLETE CBC W/AUTO DIFF WBC: CPT

## 2024-12-09 PROCEDURE — 80053 COMPREHEN METABOLIC PANEL: CPT

## 2024-12-09 PROCEDURE — 36415 COLL VENOUS BLD VENIPUNCTURE: CPT

## 2024-12-09 PROCEDURE — 80061 LIPID PANEL: CPT

## 2024-12-09 PROCEDURE — 84443 ASSAY THYROID STIM HORMONE: CPT

## 2024-12-12 ENCOUNTER — HOSPITAL ENCOUNTER (OUTPATIENT)
Dept: RADIOLOGY | Facility: HOSPITAL | Age: 64
Discharge: HOME | End: 2024-12-12
Attending: FAMILY MEDICINE
Payer: COMMERCIAL

## 2024-12-12 DIAGNOSIS — M25.552 LEFT HIP PAIN: ICD-10-CM

## 2024-12-12 PROCEDURE — 73502 X-RAY EXAM HIP UNI 2-3 VIEWS: CPT | Mod: TC,LT

## 2024-12-13 ENCOUNTER — LAB VISIT (OUTPATIENT)
Dept: LAB | Facility: HOSPITAL | Age: 64
End: 2024-12-13
Attending: FAMILY MEDICINE
Payer: COMMERCIAL

## 2024-12-13 DIAGNOSIS — M25.552 LEFT HIP PAIN: Primary | ICD-10-CM

## 2024-12-13 LAB
CRP SERPL-MCNC: <1 MG/L
ERYTHROCYTE [SEDIMENTATION RATE] IN BLOOD: 8 MM/HR (ref 0–15)

## 2024-12-13 PROCEDURE — 86140 C-REACTIVE PROTEIN: CPT

## 2024-12-13 PROCEDURE — 36415 COLL VENOUS BLD VENIPUNCTURE: CPT

## 2024-12-13 PROCEDURE — 85652 RBC SED RATE AUTOMATED: CPT

## 2025-07-30 ENCOUNTER — LAB VISIT (OUTPATIENT)
Dept: LAB | Facility: HOSPITAL | Age: 65
End: 2025-07-30
Attending: FAMILY MEDICINE
Payer: COMMERCIAL

## 2025-07-30 DIAGNOSIS — E78.41 ELEVATED LIPOPROTEIN A LEVEL: ICD-10-CM

## 2025-07-30 DIAGNOSIS — M10.9 GOUT, UNSPECIFIED CAUSE, UNSPECIFIED CHRONICITY, UNSPECIFIED SITE: ICD-10-CM

## 2025-07-30 DIAGNOSIS — I11.9 MALIGNANT HYPERTENSIVE HEART DISEASE WITHOUT HEART FAILURE: Primary | ICD-10-CM

## 2025-07-30 DIAGNOSIS — Z12.5 SPECIAL SCREENING FOR MALIGNANT NEOPLASM OF PROSTATE: ICD-10-CM

## 2025-07-30 LAB
ALBUMIN SERPL-MCNC: 4.2 G/DL (ref 3.4–4.8)
ALBUMIN/GLOB SERPL: 1.1 RATIO (ref 1.1–2)
ALP SERPL-CCNC: 42 UNIT/L (ref 40–150)
ALT SERPL-CCNC: 21 UNIT/L (ref 0–55)
ANION GAP SERPL CALC-SCNC: 11 MEQ/L
AST SERPL-CCNC: 24 UNIT/L (ref 11–45)
BASOPHILS # BLD AUTO: 0.04 X10(3)/MCL
BASOPHILS NFR BLD AUTO: 0.8 %
BILIRUB SERPL-MCNC: 0.9 MG/DL
BUN SERPL-MCNC: 17 MG/DL (ref 8.4–25.7)
CALCIUM SERPL-MCNC: 9.4 MG/DL (ref 8.8–10)
CHLORIDE SERPL-SCNC: 104 MMOL/L (ref 98–107)
CHOLEST SERPL-MCNC: 204 MG/DL
CHOLEST/HDLC SERPL: 5 {RATIO} (ref 0–5)
CO2 SERPL-SCNC: 22 MMOL/L (ref 23–31)
CREAT SERPL-MCNC: 1.4 MG/DL (ref 0.72–1.25)
CREAT/UREA NIT SERPL: 12
EOSINOPHIL # BLD AUTO: 0.25 X10(3)/MCL (ref 0–0.9)
EOSINOPHIL NFR BLD AUTO: 5.1 %
ERYTHROCYTE [DISTWIDTH] IN BLOOD BY AUTOMATED COUNT: 11.7 % (ref 11.5–17)
GFR SERPLBLD CREATININE-BSD FMLA CKD-EPI: 56 ML/MIN/1.73/M2
GLOBULIN SER-MCNC: 4 GM/DL (ref 2.4–3.5)
GLUCOSE SERPL-MCNC: 83 MG/DL (ref 82–115)
HCT VFR BLD AUTO: 40.9 % (ref 42–52)
HDLC SERPL-MCNC: 42 MG/DL (ref 35–60)
HGB BLD-MCNC: 14.4 G/DL (ref 14–18)
IMM GRANULOCYTES # BLD AUTO: 0.03 X10(3)/MCL (ref 0–0.04)
IMM GRANULOCYTES NFR BLD AUTO: 0.6 %
LDLC SERPL CALC-MCNC: 88 MG/DL (ref 50–140)
LYMPHOCYTES # BLD AUTO: 1.22 X10(3)/MCL (ref 0.6–4.6)
LYMPHOCYTES NFR BLD AUTO: 24.9 %
MCH RBC QN AUTO: 30.1 PG (ref 27–31)
MCHC RBC AUTO-ENTMCNC: 35.2 G/DL (ref 33–36)
MCV RBC AUTO: 85.6 FL (ref 80–94)
MONOCYTES # BLD AUTO: 0.41 X10(3)/MCL (ref 0.1–1.3)
MONOCYTES NFR BLD AUTO: 8.4 %
NEUTROPHILS # BLD AUTO: 2.94 X10(3)/MCL (ref 2.1–9.2)
NEUTROPHILS NFR BLD AUTO: 60.2 %
NRBC BLD AUTO-RTO: 0 %
PLATELET # BLD AUTO: 204 X10(3)/MCL (ref 130–400)
PMV BLD AUTO: 9.7 FL (ref 7.4–10.4)
POTASSIUM SERPL-SCNC: 3.6 MMOL/L (ref 3.5–5.1)
PROT SERPL-MCNC: 8.2 GM/DL (ref 5.8–7.6)
PSA SERPL-MCNC: 0.38 NG/ML
RBC # BLD AUTO: 4.78 X10(6)/MCL (ref 4.7–6.1)
SODIUM SERPL-SCNC: 137 MMOL/L (ref 136–145)
TRIGL SERPL-MCNC: 372 MG/DL (ref 34–140)
TSH SERPL-ACNC: 0.78 UIU/ML (ref 0.35–4.94)
URATE SERPL-MCNC: 10 MG/DL (ref 3.5–7.2)
VLDLC SERPL CALC-MCNC: 74 MG/DL
WBC # BLD AUTO: 4.89 X10(3)/MCL (ref 4.5–11.5)

## 2025-07-30 PROCEDURE — 84443 ASSAY THYROID STIM HORMONE: CPT

## 2025-07-30 PROCEDURE — 36415 COLL VENOUS BLD VENIPUNCTURE: CPT

## 2025-07-30 PROCEDURE — 84153 ASSAY OF PSA TOTAL: CPT

## 2025-07-30 PROCEDURE — 80053 COMPREHEN METABOLIC PANEL: CPT

## 2025-07-30 PROCEDURE — 85025 COMPLETE CBC W/AUTO DIFF WBC: CPT

## 2025-07-30 PROCEDURE — 80061 LIPID PANEL: CPT

## 2025-07-30 PROCEDURE — 84550 ASSAY OF BLOOD/URIC ACID: CPT

## (undated) DEVICE — KIT SURGICAL COLON .25 1.1OZ

## (undated) DEVICE — FORCEP JAW4 JMBO W NDL ORANGE

## (undated) DEVICE — COVER LIGHT HANDLE RIGID GRN

## (undated) DEVICE — FORCEP CAPTURA PRO SPK 230CM

## (undated) DEVICE — RETRIEVER RESCUENET 230X3X5.5

## (undated) DEVICE — Device

## (undated) DEVICE — SYR DILATION 60CC W/GAUGE

## (undated) DEVICE — SPONGE GZ WOVEN 12-PLY 4X4IN